# Patient Record
Sex: FEMALE | Race: WHITE | Employment: OTHER | URBAN - METROPOLITAN AREA
[De-identification: names, ages, dates, MRNs, and addresses within clinical notes are randomized per-mention and may not be internally consistent; named-entity substitution may affect disease eponyms.]

---

## 2017-02-06 ENCOUNTER — IP HISTORICAL/CONVERTED ENCOUNTER (OUTPATIENT)
Dept: OTHER | Age: 67
End: 2017-02-06

## 2017-04-10 ENCOUNTER — OP HISTORICAL/CONVERTED ENCOUNTER (OUTPATIENT)
Dept: OTHER | Age: 67
End: 2017-04-10

## 2018-09-06 ENCOUNTER — OFFICE VISIT (OUTPATIENT)
Dept: ENDOCRINOLOGY | Age: 68
End: 2018-09-06

## 2018-09-06 ENCOUNTER — HOSPITAL ENCOUNTER (OUTPATIENT)
Dept: LAB | Age: 68
Discharge: HOME | End: 2018-09-06
Payer: MEDICARE

## 2018-09-06 VITALS
DIASTOLIC BLOOD PRESSURE: 89 MMHG | TEMPERATURE: 97.8 F | WEIGHT: 163.8 LBS | SYSTOLIC BLOOD PRESSURE: 183 MMHG | HEART RATE: 63 BPM | RESPIRATION RATE: 16 BRPM | HEIGHT: 64 IN | OXYGEN SATURATION: 95 % | BODY MASS INDEX: 27.96 KG/M2

## 2018-09-06 DIAGNOSIS — E04.9 GOITER: Primary | ICD-10-CM

## 2018-09-06 DIAGNOSIS — E05.90 HYPERTHYROIDISM: ICD-10-CM

## 2018-09-06 DIAGNOSIS — T46.2X5A AMIODARONE-INDUCED HYPERTHYROIDISM: ICD-10-CM

## 2018-09-06 DIAGNOSIS — E05.80 AMIODARONE-INDUCED HYPERTHYROIDISM: ICD-10-CM

## 2018-09-06 PROCEDURE — 84439 ASSAY OF FREE THYROXINE: CPT

## 2018-09-06 PROCEDURE — 36415 COLL VENOUS BLD VENIPUNCTURE: CPT

## 2018-09-06 PROCEDURE — 83520 IMMUNOASSAY QUANT NOS NONAB: CPT

## 2018-09-06 PROCEDURE — 84480 ASSAY TRIIODOTHYRONINE (T3): CPT

## 2018-09-06 PROCEDURE — 84443 ASSAY THYROID STIM HORMONE: CPT

## 2018-09-06 RX ORDER — APIXABAN 5 MG/1
10 TABLET, FILM COATED ORAL DAILY
COMMUNITY
Start: 2018-08-31 | End: 2022-09-08

## 2018-09-06 RX ORDER — LISINOPRIL 5 MG/1
5 TABLET ORAL DAILY
COMMUNITY
Start: 2018-07-21 | End: 2022-09-08

## 2018-09-06 RX ORDER — BISMUTH SUBSALICYLATE 262 MG
1 TABLET,CHEWABLE ORAL DAILY
COMMUNITY
End: 2020-02-27

## 2018-09-06 RX ORDER — METOPROLOL TARTRATE 50 MG/1
75 TABLET ORAL 2 TIMES DAILY
COMMUNITY
Start: 2018-08-28 | End: 2020-02-27

## 2018-09-06 RX ORDER — FUROSEMIDE 80 MG/1
80 TABLET ORAL DAILY
Status: ON HOLD | COMMUNITY
Start: 2018-07-21 | End: 2021-07-03 | Stop reason: SDUPTHER

## 2018-09-06 RX ORDER — DIGOXIN 125 MCG
0.12 TABLET ORAL DAILY
COMMUNITY
Start: 2018-08-21 | End: 2022-09-04

## 2018-09-06 RX ORDER — AMIODARONE HYDROCHLORIDE 200 MG/1
200 TABLET ORAL DAILY
COMMUNITY
Start: 2018-08-31 | End: 2022-09-08

## 2018-09-06 NOTE — PROGRESS NOTES
Martha Bryant is a 78 y.o. female here for   Chief Complaint   Patient presents with    New Patient     referred by NP Lisbet Cobb for Thyroid       1. Have you been to the ER, urgent care clinic since your last visit? Hospitalized since your last visit? -n/a    2. Have you seen or consulted any other health care providers outside of the [address] [place] [address] since your last visit?   Include any pap smears or colon screening.-n/a

## 2018-09-06 NOTE — PROGRESS NOTES
[facility]               Alicia Borden MD        [address] 78 444 81 66 Fax [phone]           Patient Information  Date:9/9/2018  Name : Merari Rodríguez 78 y.o.     YOB: 1950         Referred by: Sin Berg MD         Chief Complaint   Patient presents with   [facility] Patient     referred by JODI Quinn for Thyroid       History of present illness    Merari Rodríguez is a 78 y.o. female  here for evaluation of thyroid. She was referred initially in April 2018, she canceled the appointments twice in the past  Clinically she was found to have goiter, subsequently had thyroid ultrasound which showed diffusely enlarged thyroid gland with no dominant nodules. Labs were suggestive of hyperthyroidism. She did lose weight, nervousness, shakiness which has improved the last 1 month. She is on amiodarone for atrial fibrillation, beta blockers also. Last 1 month she has gained 2 pounds    No change in the size of the neck or neck pain. No dysphagia,dysphonia or dyspnea. No history of known radiation exposure     FH of thyroid disease. No FH of thyroid cancer     Wt Readings from Last 3 Encounters:   09/06/18 163 lb 12.8 oz (74.3 kg)       Past Medical History:   Diagnosis Date    Atrial fibrillation (HCC)     Cardiomyopathy (HCC)     Chronic systolic heart failure (HCC)     COPD (chronic obstructive pulmonary disease) (HCC)     Goiter     Histoplasmosis     Hypertension        Current Outpatient Prescriptions   Medication Sig    digoxin (LANOXIN) 0.125 mg tablet Take 0.125 mg by mouth daily.  lisinopril (PRINIVIL, ZESTRIL) 5 mg tablet Take 5 mg by mouth daily.  amiodarone (CORDARONE) 200 mg tablet Take 200 mg by mouth daily.  metoprolol tartrate (LOPRESSOR) 50 mg tablet Take 75 mg by mouth two (2) times a day.  furosemide (LASIX) 80 mg tablet Take 80 mg by mouth daily.  ELIQUIS 5 mg tablet Take 10 mg by mouth daily.     multivitamin (ONE A DAY) tablet Take 1 Tab by mouth daily. No current facility-administered medications for this visit. Review of Systems:  - Constitutional Symptoms: no fevers, chills, + weight loss  - Eyes: no blurry vision or double vision  - Cardiovascular: no chest pain , no palpitations  - Respiratory: no cough or shortness of breath  - Gastrointestinal: no dysphagia or abdominal pain  - Musculoskeletal: no joint pains or weakness  - Integumentary: no rashes  - Neurological: no numbness, tingling, or headaches  - Psychiatric: no depression or anxiety  - Endocrine: no heat or cold intolerance, no polyuria or polydipsia    Physical Examination:  Blood pressure 183/89, pulse 63, temperature 97.8 °F (36.6 °C), temperature source Oral, resp. rate 16, height 5' 4\" (1.626 m), weight 163 lb 12.8 oz (74.3 kg), SpO2 95 %. Body mass index is 28.12 kg/(m^2). - General: pleasant, no distress, good eye contact  - HEENT: no exopthalmos, no periorbital edema, no scleral/conjunctival injection, EOMI, no lid lag or stare  - Neck: supple, diffuse thyromegaly, nontender, negative Caballo sign  - Cardiovascular: regular,  normal S1 and S2, no murmurs  - Respiratory: clear to auscultation bilaterally  - Gastrointestinal: soft, nontender, nondistended, BS +  - Musculoskeletal: no proximal muscle weakness in upper or lower extremities  - Integumentary: Fine tremors, no edema  - Neurological: alert and oriented   - Psychiatric: normal mood and affect  - Skin - normal turgor    Data Reviewed:         [] Reviewed lab  s    Assessment/Plan:     1. Goiter    2. Hyperthyroidism    3.  Amiodarone-induced hyperthyroidism      Diffuse goiter: No compressive symptoms    Hyperthyroidism based on the labs from March 2018  She  had symptoms then which has resolved now, recheck TSH, free T4, thyroid receptor antibody  She is also on amiodarone which can cause thyrotoxicosis which can be temporary if it is thyroiditis versus intrinsic over secretion if it is amiodarone induced thyrotoxicosis type I  We may not be able to use radioactive iodine therapy in patients who are on amiodarone unless there is some iodine uptake  Discussed about compressive symptoms. Addendum  Thyroid function test still shows hyperthyroidism  Need radioactive iodine uptake and scan, if there is  reasonable uptake she will benefit from radioactive iodine therapy which will decrease the goiter size, however being on amiodarone most of the cases the uptake will be low, in that case methimazole to decrease the hormone secretion followed by surgery is an option      Follow-up Disposition:  Return if symptoms worsen or fail to improve. Thank you for allowing me to participate in the care of this patient. Narinder William MD      Patient Brandon Peraza verbalized understanding  Voice-recognition software was used to generate this report, which may result in some phonetic-based errors in the grammar and contents. Even though attempts were made to correct all the mistakes, some may have been missed and remained in the body of the report.

## 2018-09-06 NOTE — MR AVS SNAPSHOT
[address] [facility] [address] 
[phone] Patient: Nicol Rosales MRN: [id number] KBY:6[birth year]98/[birth year] Visit Information Date & Time Provider Department Dept. Phone Encounter #  
 9/6/2018  1:00 PM Heather Frederick MD [facility] [phone] [id number] Follow-up Instructions Return if symptoms worsen or fail to improve. Upcoming Health Maintenance Date Due Hepatitis C Screening 1950 DTaP/Tdap/Td series (1 - Tdap) 9/20/1972 BREAST CANCER SCRN MAMMOGRAM 9/20/2001 FOBT Q 1 YEAR AGE 50-75 9/20/2001 ZOSTER VACCINE AGE 60> 7/20/2011 GLAUCOMA SCREENING Q2Y 9/20/2016 Bone Densitometry (Dexa) Screening 9/20/2016 Pneumococcal 65+ Low/Medium Risk (1 of 2 - PCV13) 9/20/2016 MEDICARE YEARLY EXAM 5/17/2018 Influenza Age 5 to Adult 8/1/2018 Allergies as of 9/6/2018  Review Complete On: 9/6/2018 By: Heather Frederick MD  
 No Known Allergies Current Immunizations  Never Reviewed No immunizations on file. Not reviewed this visit You Were Diagnosed With   
  
 Codes Comments Goiter    -  Primary ICD-10-CM: E04.9 ICD-9-CM: 240.9 Hyperthyroidism     ICD-10-CM: E05.90 ICD-9-CM: 242.90 Vitals BP Pulse Temp Resp Height(growth percentile) Weight(growth percentile) 183/89 (BP 1 Location: Left arm, BP Patient Position: Sitting) 63 97.8 °F (36.6 °C) (Oral) 16 5' 4\" (1.626 m) 163 lb 12.8 oz (74.3 kg) SpO2 BMI OB Status Smoking Status 95% 28.12 kg/m2 Hysterectomy Current Every Day Smoker Vitals History BMI and BSA Data Body Mass Index Body Surface Area  
 28.12 kg/m 2 1.83 m 2 Preferred Pharmacy Pharmacy Name Phone [id number] [address] AT [place] [address] [phone] Your Updated Medication List  
  
   
 This list is accurate as of 9/6/18  2:05 PM.  Always use your most recent med list.  
  
  
  
  
 amiodarone 200 mg tablet Commonly known as:  CORDARONE Take 200 mg by mouth daily. digoxin 0.125 mg tablet Commonly known as:  LANOXIN Take 0.125 mg by mouth daily. ELIQUIS 5 mg tablet Generic drug:  apixaban Take 10 mg by mouth daily. furosemide 80 mg tablet Commonly known as:  LASIX Take 80 mg by mouth daily. lisinopril 5 mg tablet Commonly known as:  Maureen Kristen Take 5 mg by mouth daily. metoprolol tartrate 50 mg tablet Commonly known as:  LOPRESSOR Take 75 mg by mouth two (2) times a day. multivitamin tablet Commonly known as:  ONE A DAY Take 1 Tab by mouth daily. We Performed the Following   
 T3 TOTAL C1078143 CPT(R)] T4, FREE C899275 CPT(R)] TSH 3RD GENERATION [73196 CPT(R)] TSH RECEPTOR AB [57789 CPT(R)] Follow-up Instructions Return if symptoms worsen or fail to improve. Patient Instructions Radioactive Iodine: What to Expect at [facility] Your Recovery Radioactive iodine is absorbed and concentrated by the thyroid gland. You get it in liquid or pill form. The radiation will pass out of your body through your urine within days. Until that time, you will give off radiation in your sweat, your saliva, your urine, and anything else that comes out of your body. It is important to avoid exposing other people to the radioactivity from your body. Your doctor will give you more written instructions. Follow these carefully. The instructions will tell you how far to stay away from people and how long you need to follow the precautions listed below. They will list other ways to keep other people safe. They will also tell you when it will be safe to go out, go to work, and do other activities. How can you care for yourself at home? General recommendations · For a period of time, you will need to keep your distance from other people, especially young children and pregnant women. · Avoid close contact, kissing, and sexual activity. You may need to sleep in a separate bed from your partner. · Keep the toilet very clean. Men should urinate sitting down to avoid splashing. Flush the toilet 2 or 3 times after each use. Wash your hands well with soap and lots of water each time you use the toilet. · Rinse the bathroom sink and tub well after you use them. · Use separate towels, washcloths, and sheets. Wash these and your personal clothing by themselves. Don't wash them with other people's laundry. · You may want to use a special plastic trash bag for all your trash, such as bandages, paper or plastic dishes, menstrual pads, tissues, or paper towels. Talk to your treatment facility to see if they will handle the disposal. Or after 80 days, this bag can be thrown out with your other trash. · Wash your dishes in a  or by hand. If you use disposable dishes, they must be thrown away in the special plastic trash bag. · Don't cook for other people. If you must cook, use plastic gloves. Then throw them away in the special plastic trash bag. Don't share cups, dishes, or utensils. Pregnancy and children · Your doctor will tell you when it is safe to have sex and become pregnant. · You should not breastfeed your baby after you have been treated with radioactive iodine. Ask your doctor when it's safe to breastfeed. Travel · Don't take public transportation. If you are able, it's best to drive yourself. · It is important to prepare for any problems you may have at airport security. People who have had radioactive iodine treatment can set off the radiation detection machines in airports for a week to 10 days. Check with local authorities about any steps or permission you may need to travel.  
· If you plan to travel on the interstate, you may set off radiation detectors. Most police and transportation workers are aware of medical radiation, but it may help to carry some paperwork from your doctor. Follow-up care is a key part of your treatment and safety. Be sure to make and go to all appointments, and call your doctor if you are having problems. It's also a good idea to know your test results and keep a list of the medicines you take. When should you call for help? Watch closely for any changes in your health, and be sure to contact your doctor if: 
  · You have a sore throat.  
  · You vomit.  
  · You have diarrhea. Where can you learn more? Go to http://zeferino-martha.info/. Enter U821 in the search box to learn more about \"Radioactive Iodine: What to Expect at Home. \" Current as of: May 12, 2017 Content Version: 11.7 © 9824-6846 [employer]. Care instructions adapted under license by [employer] (which disclaims liability or warranty for this information). If you have questions about a medical condition or this instruction, always ask your healthcare professional. [place] [address] any warranty or liability for your use of this information. Hyperthyroidism: Care Instructions Your Care Instructions Hyperthyroidism occurs when the thyroid gland makes too much thyroid hormone. This speeds up your metabolism-how your body uses energy. This condition can cause you to be very active, lose weight, and have sleep problems, eye problems, and a fast heart rate. It can also cause a goiter. A goiter is an enlarged thyroid gland that you can see at the front of the neck. Hyperthyroidism is often caused by Graves' disease. In Graves' disease, the body's defense (immune) system attacks the thyroid gland. Your doctor may prescribe a beta-blocker medicine to slow your pulse and calm you down. But this is not a treatment for hyperthyroidism.  It is given for your fast heart rate. Your doctor may also give you antithyroid medicine. This medicine keeps excess thyroid hormone in check. In some cases, doctors recommend radioactive iodine or surgery to remove the thyroid. After either of these treatments, you may need to take medicine to replace thyroid hormone for the rest of your life. Follow-up care is a key part of your treatment and safety. Be sure to make and go to all appointments, and call your doctor if you are having problems. It's also a good idea to know your test results and keep a list of the medicines you take. How can you care for yourself at home? · Take your medicines exactly as prescribed. You need to take the thyroid medicine at the same time each day. Call your doctor if you think you are having a problem with your medicine. · Graves' disease can make your eyes sore. Use artificial tears, eye drops, and sunglasses to protect your eyes from dryness, wind, and sun. Raise your head with pillows at night to prevent your eyes from swelling. In some cases, taping your eyelids shut at night will keep your eyes from being dry in the morning. · Make sure you get enough calcium. Foods that are rich in calcium include milk, yogurt, cheese, and dark green vegetables. · If you need to gain weight, ask your doctor about special diets. · Do not eat kelp. [place] is high in iodine, which can make hyperthyroidism worse. [place] is commonly used in [employer] and other [place] foods. You can use iodized salt and eat bread and seafood. Try to eat a balanced diet. · Do not use caffeine and other stimulants. These can make symptoms worse, such as a fast heartbeat, nervousness, and problems focusing. · Do not smoke. Smoking can make your condition worse and may lead to more serious eye problems. If you need help quitting, talk to your doctor about stop-smoking programs and medicines. These can increase your chances of quitting for good. · Lower your stress. Learn to use biofeedback, guided imagery, meditation, or other methods to relax. · Use creams or ointments for irritated skin. Ask your doctor which type to use. · Tell all your doctors about your condition. They need to know because some medicines contain iodine. When should you call for help? Call your doctor now or seek immediate medical care if: 
  · You have symptoms of a sudden, very high thyroid level (thyroid storm). These include: ¨ Being nauseated, vomiting, and having diarrhea. ¨ Sweating a lot. ¨ Feeling extremely restless and confused. ¨ Having a high fever. ¨ Having a fast heartbeat.  
  · You have sudden vision changes or eye pain.  
  · You have a fever or severe sore throat and are taking antithyroid medicines, such as PTU or methimazole.  
 Watch closely for changes in your health, and be sure to contact your doctor if: 
  · You have a sore throat or have problems swallowing.  
  · You have swollen, itchy, or red eyes or your other eye symptoms get worse, or you have new vision problems.  
  · You have signs of a low thyroid level (hypothyroidism). You may feel very tired, confused, or weak. Where can you learn more? Go to http://zeferino-martha.info/. Enter A886 in the search box to learn more about \"Hyperthyroidism: Care Instructions. \" Current as of: May 12, 2017 Content Version: 11.7 © 2752-2848 [employer]. Care instructions adapted under license by [employer] (which disclaims liability or warranty for this information). If you have questions about a medical condition or this instruction, always ask your healthcare professional. [address] any warranty or liability for your use of this information. Introducing [facility]!    
 Isha Reyes introduces [employer] patient portal. Now you can access parts of your medical record, email your doctor's office, and request medication refills online. 1. In your internet browser, go to [email or web address] Access [email or web address] 2. Click on the First Time User? Click Here link in the Sign In box. You will see the New Member Sign Up page. 3. Enter your [employer] Access Code exactly as it appears below. You will not need to use this code after youve completed the sign-up process. If you do not sign up before the expiration date, you must request a new code. · [employer] Access Code: [email or web address] Expires: 12/5/2018  2:05 PM 
 
4. Enter the last four digits of your Social Security Number (xxxx) and Date of Birth (mm/dd/yyyy) as indicated and click Submit. You will be taken to the next sign-up page. 5. Create a [employer] ID. This will be your [employer] login ID and cannot be changed, so think of one that is secure and easy to remember. 6. Create a [employer] password. You can change your password at any time. 7. Enter your Password Reset Question and Answer. This can be used at a later time if you forget your password. 8. Enter your e-mail address. You will receive e-mail notification when new information is available in [address] 9. Click Sign Up. You can now view and download portions of your medical record. 10. Click the Download Summary menu link to download a portable copy of your medical information. If you have questions, please visit the Frequently Asked Questions section of the [employer] website. Remember, [employer] is NOT to be used for urgent needs. For medical emergencies, dial 911. Now available from your iPhone and Android! Please provide this summary of care documentation to your next provider. Your primary care clinician is listed as Jazmine Molina. If you have any questions after today's visit, please call [phone].

## 2018-09-06 NOTE — PATIENT INSTRUCTIONS
Radioactive Iodine: What to Expect at Home  Your Recovery  Radioactive iodine is absorbed and concentrated by the thyroid gland. You get it in liquid or pill form. The radiation will pass out of your body through your urine within days. Until that time, you will give off radiation in your sweat, your saliva, your urine, and anything else that comes out of your body. It is important to avoid exposing other people to the radioactivity from your body. Your doctor will give you more written instructions. Follow these carefully. The instructions will tell you how far to stay away from people and how long you need to follow the precautions listed below. They will list other ways to keep other people safe. They will also tell you when it will be safe to go out, go to work, and do other activities. How can you care for yourself at home? General recommendations  · For a period of time, you will need to keep your distance from other people, especially young children and pregnant women. · Avoid close contact, kissing, and sexual activity. You may need to sleep in a separate bed from your partner. · Keep the toilet very clean. Men should urinate sitting down to avoid splashing. Flush the toilet 2 or 3 times after each use. Wash your hands well with soap and lots of water each time you use the toilet. · Rinse the bathroom sink and tub well after you use them. · Use separate towels, washcloths, and sheets. Wash these and your personal clothing by themselves. Don't wash them with other people's laundry. · You may want to use a special plastic trash bag for all your trash, such as bandages, paper or plastic dishes, menstrual pads, tissues, or paper towels. Talk to your treatment facility to see if they will handle the disposal. Or after 80 days, this bag can be thrown out with your other trash. · Wash your dishes in a  or by hand.  If you use disposable dishes, they must be thrown away in the special plastic trash bag. · Don't cook for other people. If you must cook, use plastic gloves. Then throw them away in the special plastic trash bag. Don't share cups, dishes, or utensils. Pregnancy and children  · Your doctor will tell you when it is safe to have sex and become pregnant. · You should not breastfeed your baby after you have been treated with radioactive iodine. Ask your doctor when it's safe to breastfeed. Travel  · Don't take public transportation. If you are able, it's best to drive yourself. · It is important to prepare for any problems you may have at airport security. People who have had radioactive iodine treatment can set off the radiation detection machines in airports for a week to 10 days. Check with local authorities about any steps or permission you may need to travel. · If you plan to travel on the interstate, you may set off radiation detectors. Most police and transportation workers are aware of medical radiation, but it may help to carry some paperwork from your doctor. Follow-up care is a key part of your treatment and safety. Be sure to make and go to all appointments, and call your doctor if you are having problems. It's also a good idea to know your test results and keep a list of the medicines you take. When should you call for help? Watch closely for any changes in your health, and be sure to contact your doctor if:    · You have a sore throat.     · You vomit.     · You have diarrhea. Where can you learn more? Go to http://zeferino-martha.info/. Enter Z067 in the search box to learn more about \"Radioactive Iodine: What to Expect at Home. \"  Current as of: May 12, 2017  Content Version: 11.7  © 7320-9732 [employer]. Care instructions adapted under license by [employer] (which disclaims liability or warranty for this information).  If you have questions about a medical condition or this instruction, always ask your healthcare professional. [employer], [facility] disclaims any warranty or liability for your use of this information. Hyperthyroidism: Care Instructions  Your Care Instructions  Hyperthyroidism occurs when the thyroid gland makes too much thyroid hormone. This speeds up your metabolism-how your body uses energy. This condition can cause you to be very active, lose weight, and have sleep problems, eye problems, and a fast heart rate. It can also cause a goiter. A goiter is an enlarged thyroid gland that you can see at the front of the neck. Hyperthyroidism is often caused by Graves' disease. In Graves' disease, the body's defense (immune) system attacks the thyroid gland. Your doctor may prescribe a beta-blocker medicine to slow your pulse and calm you down. But this is not a treatment for hyperthyroidism. It is given for your fast heart rate. Your doctor may also give you antithyroid medicine. This medicine keeps excess thyroid hormone in check. In some cases, doctors recommend radioactive iodine or surgery to remove the thyroid. After either of these treatments, you may need to take medicine to replace thyroid hormone for the rest of your life. Follow-up care is a key part of your treatment and safety. Be sure to make and go to all appointments, and call your doctor if you are having problems. It's also a good idea to know your test results and keep a list of the medicines you take. How can you care for yourself at home? · Take your medicines exactly as prescribed. You need to take the thyroid medicine at the same time each day. Call your doctor if you think you are having a problem with your medicine. · Graves' disease can make your eyes sore. Use artificial tears, eye drops, and sunglasses to protect your eyes from dryness, wind, and sun. Raise your head with pillows at night to prevent your eyes from swelling. In some cases, taping your eyelids shut at night will keep your eyes from being dry in the morning.   · Make sure you get enough calcium. Foods that are rich in calcium include milk, yogurt, cheese, and dark green vegetables. · If you need to gain weight, ask your doctor about special diets. · Do not eat kelp. Cassie Leventhal is high in iodine, which can make hyperthyroidism worse. Cassie Leventhal is commonly used in [employer] and other [place] foods. You can use iodized salt and eat bread and seafood. Try to eat a balanced diet. · Do not use caffeine and other stimulants. These can make symptoms worse, such as a fast heartbeat, nervousness, and problems focusing. · Do not smoke. Smoking can make your condition worse and may lead to more serious eye problems. If you need help quitting, talk to your doctor about stop-smoking programs and medicines. These can increase your chances of quitting for good. · Lower your stress. Learn to use biofeedback, guided imagery, meditation, or other methods to relax. · Use creams or ointments for irritated skin. Ask your doctor which type to use. · Tell all your doctors about your condition. They need to know because some medicines contain iodine. When should you call for help? Call your doctor now or seek immediate medical care if:    · You have symptoms of a sudden, very high thyroid level (thyroid storm). These include:  ¨ Being nauseated, vomiting, and having diarrhea. ¨ Sweating a lot. ¨ Feeling extremely restless and confused. ¨ Having a high fever. ¨ Having a fast heartbeat.     · You have sudden vision changes or eye pain.     · You have a fever or severe sore throat and are taking antithyroid medicines, such as PTU or methimazole.    Watch closely for changes in your health, and be sure to contact your doctor if:    · You have a sore throat or have problems swallowing.     · You have swollen, itchy, or red eyes or your other eye symptoms get worse, or you have new vision problems.     · You have signs of a low thyroid level (hypothyroidism). You may feel very tired, confused, or weak.    Where can you learn more? Go to http://zeferino-martha.info/. Enter E871 in the search box to learn more about \"Hyperthyroidism: Care Instructions. \"  Current as of: May 12, 2017  Content Version: 11.7  © 5975-0310 [employer]. Care instructions adapted under license by [employer] (which disclaims liability or warranty for this information). If you have questions about a medical condition or this instruction, always ask your healthcare professional. [address] any warranty or liability for your use of this information.

## 2018-09-07 LAB
T3 SERPL-MCNC: 186 NG/DL (ref 71–180)
T4 FREE SERPL-MCNC: 4.47 NG/DL (ref 0.82–1.77)
TSH RECEP AB SER-ACNC: <0.5 IU/L (ref 0–1.75)
TSH SERPL DL<=0.005 MIU/L-ACNC: <0.006 UIU/ML (ref 0.45–4.5)

## 2018-09-08 DIAGNOSIS — E04.9 GOITER: ICD-10-CM

## 2018-09-08 DIAGNOSIS — E05.80 AMIODARONE-INDUCED HYPERTHYROIDISM: ICD-10-CM

## 2018-09-08 DIAGNOSIS — E05.90 HYPERTHYROIDISM: Primary | ICD-10-CM

## 2018-09-08 DIAGNOSIS — T46.2X5A AMIODARONE-INDUCED HYPERTHYROIDISM: ICD-10-CM

## 2018-09-08 NOTE — PROGRESS NOTES
Thyroid still very overactive, she needs thyroid scan and iodine treatment as discussed during the visit  I will order it and the hospital will call her.   It is very important to get treated on her as there is risk for heart problems,fractures

## 2018-09-10 ENCOUNTER — TELEPHONE (OUTPATIENT)
Dept: ENDOCRINOLOGY | Age: 68
End: 2018-09-10

## 2018-09-10 NOTE — TELEPHONE ENCOUNTER
----- Message from Pearline Severs, MD sent at 9/8/2018 12:42 PM EDT -----  Thyroid still very overactive, she needs thyroid scan and iodine treatment as discussed during the visit  I will order it and the hospital will call her.   It is very important to get treated on her as there is risk for heart problems,fractures

## 2018-09-10 NOTE — TELEPHONE ENCOUNTER
Per Dr. Kelly Fong, informed pt of result note, as noted above. Pt verbalized understanding with no further questions or concerns at this time.

## 2019-10-26 ENCOUNTER — IP HISTORICAL/CONVERTED ENCOUNTER (OUTPATIENT)
Dept: OTHER | Age: 69
End: 2019-10-26

## 2019-11-05 LAB
HBA1C MFR BLD HPLC: 5.8 %
LDL-C, EXTERNAL: 76

## 2019-11-06 ENCOUNTER — IP HISTORICAL/CONVERTED ENCOUNTER (OUTPATIENT)
Dept: OTHER | Age: 69
End: 2019-11-06

## 2019-11-23 LAB — CREATININE, EXTERNAL: 1.18

## 2019-12-30 ENCOUNTER — OFFICE VISIT (OUTPATIENT)
Dept: ENDOCRINOLOGY | Age: 69
End: 2019-12-30

## 2019-12-30 VITALS
SYSTOLIC BLOOD PRESSURE: 163 MMHG | WEIGHT: 151.6 LBS | TEMPERATURE: 97.5 F | BODY MASS INDEX: 25.88 KG/M2 | HEIGHT: 64 IN | RESPIRATION RATE: 18 BRPM | DIASTOLIC BLOOD PRESSURE: 89 MMHG | HEART RATE: 90 BPM | OXYGEN SATURATION: 97 %

## 2019-12-30 DIAGNOSIS — E05.80 AMIODARONE-INDUCED HYPERTHYROIDISM: ICD-10-CM

## 2019-12-30 DIAGNOSIS — T46.2X5A AMIODARONE-INDUCED HYPERTHYROIDISM: ICD-10-CM

## 2019-12-30 DIAGNOSIS — E05.80 AMIODARONE-INDUCED HYPERTHYROIDISM: Primary | ICD-10-CM

## 2019-12-30 DIAGNOSIS — T46.2X5A AMIODARONE-INDUCED HYPERTHYROIDISM: Primary | ICD-10-CM

## 2019-12-30 DIAGNOSIS — E05.90 HYPERTHYROIDISM: Primary | ICD-10-CM

## 2019-12-30 DIAGNOSIS — E04.9 GOITER: ICD-10-CM

## 2019-12-30 RX ORDER — METHIMAZOLE 10 MG/1
20 TABLET ORAL DAILY
Qty: 60 TAB | Refills: 5 | Status: SHIPPED | OUTPATIENT
Start: 2019-12-30 | End: 2020-02-27 | Stop reason: SDUPTHER

## 2019-12-30 RX ORDER — ATORVASTATIN CALCIUM 20 MG/1
TABLET, FILM COATED ORAL
COMMUNITY
Start: 2019-12-21

## 2019-12-30 RX ORDER — ERGOCALCIFEROL 1.25 MG/1
CAPSULE ORAL
COMMUNITY
Start: 2019-12-21

## 2019-12-30 NOTE — PROGRESS NOTES
[facility]               Ephraim Cuevas MD        [address] 78 [phone] Fax [phone]           Patient Information  Date:12/30/2019  Name : Moni Garcia 77 y.o.     YOB: 1950         Referred by: Carol Mccullough MD         Chief Complaint   Patient presents with    Thyroid Problem     hyperthyroid    Weight Management       History of present illness    Moni Garcia is a 77 y.o. female  here for follow-up  She was last seen in 2018, did not follow-up, did not have thyroid uptake and scan as well as ultrasound which was ordered. TSH is suppressed  No dysphagia,  On amiodarone for atrial fibrillation    Prior history  Clinically she was found to have goiter, subsequently had thyroid ultrasound which showed diffusely enlarged thyroid gland with no dominant nodules. FH of thyroid disease. No FH of thyroid cancer     Wt Readings from Last 3 Encounters:   12/30/19 151 lb 9.6 oz (68.8 kg)   09/06/18 163 lb 12.8 oz (74.3 kg)       Past Medical History:   Diagnosis Date    Atrial fibrillation (HCC)     Cardiomyopathy (HCC)     Chronic systolic heart failure (HCC)     COPD (chronic obstructive pulmonary disease) (HCC)     Goiter     Histoplasmosis     Hypertension        Current Outpatient Medications   Medication Sig    atorvastatin (LIPITOR) 20 mg tablet TAKE 1 TABLET BY MOUTH EVERY DAY    ergocalciferol (ERGOCALCIFEROL) 50,000 unit capsule TAKE 1 CAPSULE BY MOUTH ONE TIME PER WEEK    ferrous sulfate (IRON PO) Take  by mouth.  lisinopril (PRINIVIL, ZESTRIL) 5 mg tablet Take 5 mg by mouth daily.  amiodarone (CORDARONE) 200 mg tablet Take 200 mg by mouth daily.  furosemide (LASIX) 80 mg tablet Take 80 mg by mouth daily.  ELIQUIS 5 mg tablet Take 10 mg by mouth daily.  digoxin (LANOXIN) 0.125 mg tablet Take 0.125 mg by mouth daily.     metoprolol tartrate (LOPRESSOR) 50 mg tablet Take 75 mg by mouth two (2) times a day.    multivitamin (ONE A DAY) tablet Take 1 Tab by mouth daily. No current facility-administered medications for this visit. Review of Systems:  - Constitutional Symptoms: no fevers, chills, + weight loss  - Eyes: no blurry vision or double vision  - Cardiovascular: no chest pain , palpitations  - Respiratory: no cough or shortness of breath  - Gastrointestinal: no dysphagia or abdominal pain  -     Physical Examination:  Blood pressure 163/89, pulse 90, temperature 97.5 °F (36.4 °C), temperature source Oral, resp. rate 18, height 5' 4\" (1.626 m), weight 151 lb 9.6 oz (68.8 kg), SpO2 97 %. Body mass index is 26.02 kg/m². - General: pleasant, no distress, good eye contact  - HEENT: no exopthalmos, no periorbital edema, no scleral/conjunctival injection, EOMI, no lid lag or stare  - Neck: supple, diffuse thyromegaly, nontender, negative Mavis sign  - Cardiovascular: Irregular,  normal S1 and S2  - Respiratory: clear to auscultation bilaterally  - Gastrointestinal: soft, nontender, nondistended, BS +  - Musculoskeletal: no proximal muscle weakness in upper or lower extremities  - Integumentary: No tremors  - Neurological: alert and oriented   - Psychiatric: normal mood and affect  - Skin - normal turgor    Data Reviewed:         [] Reviewed lab  s    Assessment/Plan:     1. Amiodarone-induced hyperthyroidism    2.  Goiter      Diffuse goiter: No compressive symptoms    Hyperthyroidism: Amiodarone induced hyperthyroidism  She did not get the radioactive iodine uptake and scan which was ordered in 2018  We will start methimazole given comorbidities, after 3 months we will reassess the need for scan    Need radioactive iodine uptake and scan, if there is  reasonable uptake she will benefit from radioactive iodine therapy which will decrease the goiter size, however being on amiodarone most of the cases the uptake will be low, in that case methimazole to decrease the hormone secretion followed by surgery is an option          Thank you for allowing me to participate in the care of this patient. Marybel Boudreaux MD      Patient Derotha Danger verbalized understanding  Voice-recognition software was used to generate this report, which may result in some phonetic-based errors in the grammar and contents. Even though attempts were made to correct all the mistakes, some may have been missed and remained in the body of the report.

## 2019-12-30 NOTE — PATIENT INSTRUCTIONS
Start Methimazole 20 mg in AM     Potential side effects of methimazole are rash,low blood count and rarely liver inflammation. If you get high grade fever,bad sorethroat,or sores in the mouth ,please call the office for blood tests. If white blood count is low,the methimazole should be stopped and the counts will normalize in 7 to 10 days.

## 2019-12-30 NOTE — PROGRESS NOTES
Room 2    Identified pt with two pt identifiers(name and ). Reviewed record in preparation for visit and have obtained necessary documentation. All patient medications has been reviewed. Chief Complaint   Patient presents with    Thyroid Problem     hyperthyroid    Weight Management       Health Maintenance Due   Topic    Hepatitis C Screening     DTaP/Tdap/Td series (1 - Tdap)    Shingrix Vaccine Age 50> (1 of 2)    BREAST CANCER SCRN MAMMOGRAM     FOBT Q 1 YEAR AGE 50-75     GLAUCOMA SCREENING Q2Y     Bone Densitometry (Dexa) Screening     Pneumococcal 65+ years (1 of 1 - PPSV23)    MEDICARE YEARLY EXAM     Influenza Age 5 to Adult        Vitals:    19 1022   BP: 163/89   Pulse: 90   Resp: 18   Temp: 97.5 °F (36.4 °C)   TempSrc: Oral   SpO2: 97%   Weight: 151 lb 9.6 oz (68.8 kg)   Height: 5' 4\" (1.626 m)   PainSc:   0 - No pain       Wt Readings from Last 3 Encounters:   19 151 lb 9.6 oz (68.8 kg)   18 163 lb 12.8 oz (74.3 kg)     Temp Readings from Last 3 Encounters:   19 97.5 °F (36.4 °C) (Oral)   18 97.8 °F (36.6 °C) (Oral)     BP Readings from Last 3 Encounters:   19 163/89   18 183/89     Pulse Readings from Last 3 Encounters:   19 90   18 63       Lab Results   Component Value Date/Time    Hemoglobin A1c, External 6.3 2018       Coordination of Care Questionnaire:   1) Have you been to an emergency room, urgent care, or hospitalized since your last visit?   no       2. Have seen or consulted any other health care provider since your last visit? NO    3) Do you have an Advanced Directive/ Living Will in place? NO  If yes, do we have a copy on file NO  If no, would you like information NO    Patient is accompanied by self I have received verbal consent from Fabricio Abel to discuss any/all medical information while they are present in the room.

## 2020-02-24 ENCOUNTER — HOSPITAL ENCOUNTER (OUTPATIENT)
Dept: LAB | Age: 70
Discharge: HOME | End: 2020-02-24

## 2020-02-24 DIAGNOSIS — E05.80 AMIODARONE-INDUCED HYPERTHYROIDISM: ICD-10-CM

## 2020-02-24 DIAGNOSIS — E04.9 GOITER: ICD-10-CM

## 2020-02-24 DIAGNOSIS — T46.2X5A AMIODARONE-INDUCED HYPERTHYROIDISM: ICD-10-CM

## 2020-02-24 LAB
T4 FREE SERPL-MCNC: 0.8 NG/DL (ref 0.8–1.5)
TSH SERPL DL<=0.05 MIU/L-ACNC: 0.03 UIU/ML (ref 0.36–3.74)

## 2020-02-26 LAB — T3 SERPL-MCNC: 74 NG/DL (ref 71–180)

## 2020-02-27 ENCOUNTER — OFFICE VISIT (OUTPATIENT)
Dept: ENDOCRINOLOGY | Age: 70
End: 2020-02-27

## 2020-02-27 VITALS
DIASTOLIC BLOOD PRESSURE: 83 MMHG | RESPIRATION RATE: 20 BRPM | TEMPERATURE: 96.2 F | WEIGHT: 159 LBS | OXYGEN SATURATION: 100 % | SYSTOLIC BLOOD PRESSURE: 150 MMHG | BODY MASS INDEX: 27.14 KG/M2 | HEART RATE: 76 BPM | HEIGHT: 64 IN

## 2020-02-27 DIAGNOSIS — E05.80 AMIODARONE-INDUCED HYPERTHYROIDISM: ICD-10-CM

## 2020-02-27 DIAGNOSIS — E05.90 HYPERTHYROIDISM: ICD-10-CM

## 2020-02-27 DIAGNOSIS — E04.9 GOITER: Primary | ICD-10-CM

## 2020-02-27 DIAGNOSIS — T46.2X5A AMIODARONE-INDUCED HYPERTHYROIDISM: ICD-10-CM

## 2020-02-27 RX ORDER — METHIMAZOLE 10 MG/1
10 TABLET ORAL DAILY
Qty: 90 TAB | Refills: 3 | Status: SHIPPED | OUTPATIENT
Start: 2020-02-27 | End: 2021-03-04

## 2020-02-27 NOTE — LETTER
2/28/20 Patient: Allie Echols YOB: 1950 Date of Visit: 2/27/2020 Shirley Perez MD 
[address] [facility] [address] VIA Facsimile: [phone] Dear Shirley Perez MD, Thank you for referring Ms. Allie Echols to [address] [place] [address] for evaluation. My notes for this consultation are attached. If you have questions, please do not hesitate to call me. I look forward to following your patient along with you. Sincerely, Boris Nails MD

## 2020-02-27 NOTE — PROGRESS NOTES
Josefa Wesley MD          Patient Information  Date:2/27/2020  Name : Carrillo Badillo 77 y.o.     YOB: 1950         Referred by: Brynn Flores MD         Chief Complaint   Patient presents with    Thyroid Problem       History of present illness    Carrillo Badillo is a 77 y.o. female  here for follow-up  She was initially evaluated in 2018, did not follow-up, did not have thyroid uptake and scan as well as ultrasound which was ordered. She was later seen in December 2019, severely symptomatic as a result of hyperthyroidism  On amiodarone for atrial fibrillation  She was started on methimazole  She feels better  No palpitations, gaining weight no      Prior history  Clinically she was found to have goiter 2018 ,subsequently had thyroid ultrasound which showed diffusely enlarged thyroid gland with no dominant nodules. FH of thyroid disease. No FH of thyroid cancer     Wt Readings from Last 3 Encounters:   02/27/20 159 lb (72.1 kg)   12/30/19 151 lb 9.6 oz (68.8 kg)   09/06/18 163 lb 12.8 oz (74.3 kg)       Past Medical History:   Diagnosis Date    Atrial fibrillation (HCC)     Cardiomyopathy (HCC)     Chronic systolic heart failure (HCC)     COPD (chronic obstructive pulmonary disease) (HCC)     Goiter     Histoplasmosis     Hypertension        Current Outpatient Medications   Medication Sig    atorvastatin (LIPITOR) 20 mg tablet TAKE 1 TABLET BY MOUTH EVERY DAY    ergocalciferol (ERGOCALCIFEROL) 50,000 unit capsule TAKE 1 CAPSULE BY MOUTH ONE TIME PER WEEK    ferrous sulfate (IRON PO) Take  by mouth.  methIMAzole (TAPAZOLE) 10 mg tablet Take 2 Tabs by mouth daily.  lisinopril (PRINIVIL, ZESTRIL) 5 mg tablet Take 5 mg by mouth daily.  amiodarone (CORDARONE) 200 mg tablet Take 200 mg by mouth daily.  furosemide (LASIX) 80 mg tablet Take 80 mg by mouth daily.  ELIQUIS 5 mg tablet Take 10 mg by mouth daily.     digoxin (LANOXIN) 0.125 mg tablet Take 0.125 mg by mouth daily. No current facility-administered medications for this visit. Review of Systems:  - Constitutional Symptoms: no fevers, chills,  - Eyes: no blurry vision or double vision  - Cardiovascular: no chest pain , palpitations  - Respiratory: no cough or shortness of breath  - Gastrointestinal: no dysphagia or abdominal pain  -     Physical Examination:  Blood pressure 150/83, pulse 76, temperature 96.2 °F (35.7 °C), temperature source Oral, resp. rate 20, height 5' 4\" (1.626 m), weight 159 lb (72.1 kg), SpO2 100 %. Body mass index is 27.29 kg/m². - General: pleasant, no distress, good eye contact  - HEENT: no exopthalmos, no periorbital edema, no scleral/conjunctival injection, EOMI, no lid lag or stare  - Neck: supple, diffuse thyromegaly, nontender, negative [place] sign  - Cardiovascular: Irregular,  normal S1 and S2  - Respiratory: clear to auscultation bilaterally  - Gastrointestinal: soft, nontender, nondistended, BS +  - Musculoskeletal: no proximal muscle weakness in upper or lower extremities  - Integumentary: No tremors  - Neurological: alert and oriented   - Psychiatric: normal mood and affect  - Skin - normal turgor    Data Reviewed:         [] Reviewed lab  s    Assessment/Plan:     1. Goiter    2.  Amiodarone-induced hyperthyroidism      Diffuse goiter: No compressive symptoms    Hyperthyroidism: Amiodarone induced hyperthyroidism  Started methimazole 12/2019    Need radioactive iodine uptake and scan, if there is  reasonable uptake she will benefit from radioactive iodine therapy which will decrease the goiter size, however being on amiodarone most of the cases the uptake will be low, in that case methimazole to decrease the hormone secretion followed by surgery is an option if she has compressive symptoms  She is doing well on methimazole, would like to continue  Decrease the dose          Thank you for allowing me to participate in the care of this patient. Patrick Carrillo MD      Patient Natalia Asencio verbalized understanding  Voice-recognition software was used to generate this report, which may result in some phonetic-based errors in the grammar and contents. Even though attempts were made to correct all the mistakes, some may have been missed and remained in the body of the report.

## 2020-02-27 NOTE — PROGRESS NOTES
Johanne Melton is a 77 y.o. female here for   Chief Complaint   Patient presents with    Thyroid Problem       1. Have you been to the ER, urgent care clinic since your last visit? Hospitalized since your last visit? -no    2. Have you seen or consulted any other health care providers outside of the [address] [place] [address] since your last visit?   Include any pap smears or colon screening.-no

## 2020-06-08 ENCOUNTER — TELEPHONE (OUTPATIENT)
Dept: ENDOCRINOLOGY | Age: 70
End: 2020-06-08

## 2020-06-08 DIAGNOSIS — E05.90 HYPERTHYROIDISM: Primary | ICD-10-CM

## 2020-06-08 NOTE — TELEPHONE ENCOUNTER
Order placed for pt per verbal order with read back from Dr. Zygmunt Mcburney 06/08/20    Lab slips mailed

## 2020-07-01 DIAGNOSIS — E05.90 HYPERTHYROIDISM: ICD-10-CM

## 2020-07-08 LAB
CREATININE, EXTERNAL: 9
LDL-C, EXTERNAL: 76

## 2021-03-31 DIAGNOSIS — E05.90 HYPERTHYROIDISM: Primary | ICD-10-CM

## 2021-05-05 DIAGNOSIS — E05.90 HYPERTHYROIDISM: ICD-10-CM

## 2021-05-05 DIAGNOSIS — E05.80 AMIODARONE-INDUCED HYPERTHYROIDISM: ICD-10-CM

## 2021-05-05 DIAGNOSIS — T46.2X5A AMIODARONE-INDUCED HYPERTHYROIDISM: ICD-10-CM

## 2021-05-07 RX ORDER — METHIMAZOLE 10 MG/1
TABLET ORAL
Qty: 30 TAB | Refills: 0 | Status: SHIPPED | OUTPATIENT
Start: 2021-05-07 | End: 2021-06-04

## 2021-06-03 DIAGNOSIS — T46.2X5A AMIODARONE-INDUCED HYPERTHYROIDISM: ICD-10-CM

## 2021-06-03 DIAGNOSIS — E05.90 HYPERTHYROIDISM: ICD-10-CM

## 2021-06-03 DIAGNOSIS — E05.80 AMIODARONE-INDUCED HYPERTHYROIDISM: ICD-10-CM

## 2021-06-04 RX ORDER — METHIMAZOLE 10 MG/1
TABLET ORAL
Qty: 30 TABLET | Refills: 0 | Status: SHIPPED | OUTPATIENT
Start: 2021-06-04 | End: 2021-07-05

## 2021-07-02 ENCOUNTER — HOSPITAL ENCOUNTER (INPATIENT)
Age: 71
LOS: 1 days | Discharge: HOME | DRG: 291 | End: 2021-07-03
Attending: HOSPITALIST | Admitting: HOSPITALIST
Payer: MEDICARE

## 2021-07-02 ENCOUNTER — APPOINTMENT (OUTPATIENT)
Dept: GENERAL RADIOLOGY | Age: 71
DRG: 291 | End: 2021-07-02
Attending: PHYSICIAN ASSISTANT
Payer: MEDICARE

## 2021-07-02 DIAGNOSIS — I50.9 ACUTE ON CHRONIC CONGESTIVE HEART FAILURE, UNSPECIFIED HEART FAILURE TYPE (HCC): Primary | ICD-10-CM

## 2021-07-02 DIAGNOSIS — E04.9 GOITER: ICD-10-CM

## 2021-07-02 DIAGNOSIS — E05.90 HYPERTHYROIDISM: ICD-10-CM

## 2021-07-02 DIAGNOSIS — J96.11 CHRONIC HYPOXEMIC RESPIRATORY FAILURE (HCC): ICD-10-CM

## 2021-07-02 DIAGNOSIS — R06.89 HYPERCAPNIA: ICD-10-CM

## 2021-07-02 LAB
ALBUMIN SERPL-MCNC: 3.4 G/DL (ref 3.5–5)
ALBUMIN/GLOB SERPL: 1 {RATIO} (ref 1.1–2.2)
ALP SERPL-CCNC: 92 U/L (ref 45–117)
ALT SERPL-CCNC: 18 U/L (ref 12–78)
ANION GAP SERPL CALC-SCNC: 0 MMOL/L (ref 5–15)
AST SERPL W P-5'-P-CCNC: 13 U/L (ref 15–37)
ATRIAL RATE: 91 BPM
BASE EXCESS BLDV CALC-SCNC: 15.4 MMOL/L (ref 0–2)
BASOPHILS # BLD: 0 K/UL (ref 0–0.1)
BASOPHILS NFR BLD: 1 % (ref 0–1)
BDY SITE: ABNORMAL
BILIRUB SERPL-MCNC: 0.5 MG/DL (ref 0.2–1)
BNP SERPL-MCNC: 5714 PG/ML
BUN SERPL-MCNC: 18 MG/DL (ref 6–20)
BUN/CREAT SERPL: 13 (ref 12–20)
CA-I BLD-MCNC: 7.7 MG/DL (ref 8.5–10.1)
CALCULATED R AXIS, ECG10: -55 DEGREES
CALCULATED T AXIS, ECG11: 101 DEGREES
CHLORIDE SERPL-SCNC: 98 MMOL/L (ref 97–108)
CO2 SERPL-SCNC: 42 MMOL/L (ref 21–32)
CREAT SERPL-MCNC: 1.44 MG/DL (ref 0.55–1.02)
DIAGNOSIS, 93000: NORMAL
DIFFERENTIAL METHOD BLD: ABNORMAL
EOSINOPHIL # BLD: 0.1 K/UL (ref 0–0.4)
EOSINOPHIL NFR BLD: 1 % (ref 0–7)
ERYTHROCYTE [DISTWIDTH] IN BLOOD BY AUTOMATED COUNT: 14.2 % (ref 11.5–14.5)
FIO2 ON VENT: 21 %
GLOBULIN SER CALC-MCNC: 3.5 G/DL (ref 2–4)
GLUCOSE SERPL-MCNC: 86 MG/DL (ref 65–100)
HCO3 BLDV-SCNC: 39 MMOL/L (ref 22–26)
HCT VFR BLD AUTO: 40.1 % (ref 35–47)
HGB BLD-MCNC: 11.8 G/DL (ref 11.5–16)
IMM GRANULOCYTES # BLD AUTO: 0 K/UL (ref 0–0.04)
IMM GRANULOCYTES NFR BLD AUTO: 0 % (ref 0–0.5)
LYMPHOCYTES # BLD: 1 K/UL (ref 0.8–3.5)
LYMPHOCYTES NFR BLD: 14 % (ref 12–49)
MCH RBC QN AUTO: 27.1 PG (ref 26–34)
MCHC RBC AUTO-ENTMCNC: 29.4 G/DL (ref 30–36.5)
MCV RBC AUTO: 92.2 FL (ref 80–99)
MONOCYTES # BLD: 0.6 K/UL (ref 0–1)
MONOCYTES NFR BLD: 8 % (ref 5–13)
NEUTS SEG # BLD: 5.3 K/UL (ref 1.8–8)
NEUTS SEG NFR BLD: 76 % (ref 32–75)
NRBC # BLD: 0 K/UL (ref 0–0.01)
NRBC BLD-RTO: 0 PER 100 WBC
PCO2 BLDV: 82.6 MMHG (ref 40–50)
PH BLDV: 7.34 [PH] (ref 7.31–7.41)
PLATELET # BLD AUTO: 223 K/UL (ref 150–400)
PMV BLD AUTO: 9.8 FL (ref 8.9–12.9)
PO2 BLDV: 40 MMHG (ref 36–42)
POTASSIUM SERPL-SCNC: 3.5 MMOL/L (ref 3.5–5.1)
PROT SERPL-MCNC: 6.9 G/DL (ref 6.4–8.2)
Q-T INTERVAL, ECG07: 416 MS
QRS DURATION, ECG06: 130 MS
QTC CALCULATION (BEZET), ECG08: 491 MS
RBC # BLD AUTO: 4.35 M/UL (ref 3.8–5.2)
SAO2 % BLDV: 74 % (ref 60–80)
SAO2% DEVICE SAO2% SENSOR NAME: ABNORMAL
SODIUM SERPL-SCNC: 140 MMOL/L (ref 136–145)
TROPONIN I SERPL-MCNC: <0.05 NG/ML
VENTRICULAR RATE, ECG03: 84 BPM
WBC # BLD AUTO: 7 K/UL (ref 3.6–11)

## 2021-07-02 PROCEDURE — 80053 COMPREHEN METABOLIC PANEL: CPT

## 2021-07-02 PROCEDURE — 71045 X-RAY EXAM CHEST 1 VIEW: CPT

## 2021-07-02 PROCEDURE — 74011250637 HC RX REV CODE- 250/637: Performed by: HOSPITALIST

## 2021-07-02 PROCEDURE — 85025 COMPLETE CBC W/AUTO DIFF WBC: CPT

## 2021-07-02 PROCEDURE — 74011250636 HC RX REV CODE- 250/636: Performed by: HOSPITALIST

## 2021-07-02 PROCEDURE — 82803 BLOOD GASES ANY COMBINATION: CPT

## 2021-07-02 PROCEDURE — 65270000029 HC RM PRIVATE

## 2021-07-02 PROCEDURE — 83880 ASSAY OF NATRIURETIC PEPTIDE: CPT

## 2021-07-02 PROCEDURE — 74011250636 HC RX REV CODE- 250/636: Performed by: PHYSICIAN ASSISTANT

## 2021-07-02 PROCEDURE — 99285 EMERGENCY DEPT VISIT HI MDM: CPT

## 2021-07-02 PROCEDURE — 93005 ELECTROCARDIOGRAM TRACING: CPT

## 2021-07-02 PROCEDURE — 84484 ASSAY OF TROPONIN QUANT: CPT

## 2021-07-02 PROCEDURE — 36415 COLL VENOUS BLD VENIPUNCTURE: CPT

## 2021-07-02 RX ORDER — HEPARIN SODIUM 5000 [USP'U]/ML
5000 INJECTION, SOLUTION INTRAVENOUS; SUBCUTANEOUS EVERY 8 HOURS
Status: DISCONTINUED | OUTPATIENT
Start: 2021-07-02 | End: 2021-07-02

## 2021-07-02 RX ORDER — DIGOXIN 125 MCG
0.12 TABLET ORAL DAILY
Status: DISCONTINUED | OUTPATIENT
Start: 2021-07-03 | End: 2021-07-02

## 2021-07-02 RX ORDER — LISINOPRIL 5 MG/1
5 TABLET ORAL DAILY
Status: DISCONTINUED | OUTPATIENT
Start: 2021-07-03 | End: 2021-07-03 | Stop reason: HOSPADM

## 2021-07-02 RX ORDER — AMIODARONE HYDROCHLORIDE 200 MG/1
200 TABLET ORAL DAILY
Status: DISCONTINUED | OUTPATIENT
Start: 2021-07-03 | End: 2021-07-03 | Stop reason: HOSPADM

## 2021-07-02 RX ORDER — FUROSEMIDE 10 MG/ML
40 INJECTION INTRAMUSCULAR; INTRAVENOUS
Status: COMPLETED | OUTPATIENT
Start: 2021-07-02 | End: 2021-07-02

## 2021-07-02 RX ORDER — METHIMAZOLE 5 MG/1
10 TABLET ORAL DAILY
Status: DISCONTINUED | OUTPATIENT
Start: 2021-07-03 | End: 2021-07-03 | Stop reason: HOSPADM

## 2021-07-02 RX ORDER — ATORVASTATIN CALCIUM 20 MG/1
20 TABLET, FILM COATED ORAL DAILY
Status: DISCONTINUED | OUTPATIENT
Start: 2021-07-03 | End: 2021-07-03 | Stop reason: HOSPADM

## 2021-07-02 RX ORDER — FUROSEMIDE 10 MG/ML
40 INJECTION INTRAMUSCULAR; INTRAVENOUS 2 TIMES DAILY
Status: DISCONTINUED | OUTPATIENT
Start: 2021-07-02 | End: 2021-07-03 | Stop reason: HOSPADM

## 2021-07-02 RX ADMIN — FUROSEMIDE 40 MG: 10 INJECTION, SOLUTION INTRAMUSCULAR; INTRAVENOUS at 22:18

## 2021-07-02 RX ADMIN — APIXABAN 5 MG: 5 TABLET, FILM COATED ORAL at 22:18

## 2021-07-02 RX ADMIN — FUROSEMIDE 40 MG: 10 INJECTION, SOLUTION INTRAVENOUS at 18:33

## 2021-07-02 NOTE — ED PROVIDER NOTES
EMERGENCY DEPARTMENT HISTORY AND PHYSICAL EXAM      Date: 7/2/2021  Patient Name: Emerald Spence    History of Presenting Illness     Chief Complaint   Patient presents with    Shortness of Breath     w/ exertion x 1 wk       History Provided By: Patient    HPI: Emerald Spence, 72 y.o. female with a past medical history significant for a-fib, CHF, COPD who presents to the ED with cc of gradual onset/worsening shortness of breath with exertion x1 week. He states he symptoms include worsening bilateral lower extremity swelling. Symptoms exacerbated with exertion. Alleviated with rest.  States that she was previously on 2 L nasal cannula O2 chronically continuously and \"a while ago\" her pulmonologist allowed her to decrease to only nighttime use. Patient has not been wearing O2 continuously at home. Also states that she was previously on Lasix 80 mg daily and over the last 2 weeks she decreased to 40 mg per recommendation of her cardiologist.  Has been compliant with this medication regimen. Patient denies fever, chills, chest pain, cough, hemoptysis, nausea, vomiting, diarrhea    There are no other complaints, changes, or physical findings at this time. PCP: Leon Molina MD    No current facility-administered medications on file prior to encounter. Current Outpatient Medications on File Prior to Encounter   Medication Sig Dispense Refill    methIMAzole (TAPAZOLE) 10 mg tablet TAKE ONE TABLET BY MOUTH EVERY DAY 30 Tablet 0    atorvastatin (LIPITOR) 20 mg tablet TAKE 1 TABLET BY MOUTH EVERY DAY      ergocalciferol (ERGOCALCIFEROL) 50,000 unit capsule TAKE 1 CAPSULE BY MOUTH ONE TIME PER WEEK      ferrous sulfate (IRON PO) Take  by mouth.  digoxin (LANOXIN) 0.125 mg tablet Take 0.125 mg by mouth daily.  lisinopril (PRINIVIL, ZESTRIL) 5 mg tablet Take 5 mg by mouth daily.  amiodarone (CORDARONE) 200 mg tablet Take 200 mg by mouth daily.       furosemide (LASIX) 80 mg tablet Take 80 mg by mouth daily.      ELIQUIS 5 mg tablet Take 10 mg by mouth daily. Past History     Past Medical History:  Past Medical History:   Diagnosis Date    Atrial fibrillation ([facility] [address]     Cardiomyopathy ([facility] [address]     Chronic systolic heart failure (HCC)     COPD (chronic obstructive pulmonary disease) (HCC)     Goiter     Histoplasmosis     Hypertension        Past Surgical History:  Past Surgical History:   Procedure Laterality Date    HX CHOLECYSTECTOMY      HX HYSTERECTOMY  1994       Family History:  Family History   Problem Relation Age of Onset    Heart Attack Mother     Lung Cancer Father        Social History:  Social History     Tobacco Use    Smoking status: Current Every Day Smoker     Packs/day: 0.50    Smokeless tobacco: Never Used   Substance Use Topics    Alcohol use: No    Drug use: No       Allergies:  No Known Allergies      Review of Systems     Review of Systems   Constitutional: Negative for chills, fatigue and fever. HENT: Negative. Respiratory: Positive for shortness of breath. Negative for cough, chest tightness and wheezing. Cardiovascular: Positive for leg swelling. Negative for chest pain and palpitations. Gastrointestinal: Negative for abdominal pain, diarrhea, nausea and vomiting. Genitourinary: Negative for frequency and urgency. Musculoskeletal: Negative for back pain, neck pain and neck stiffness. Skin: Negative for rash. Neurological: Negative for dizziness, weakness, light-headedness and headaches. Psychiatric/Behavioral: Negative. All other systems reviewed and are negative. Physical Exam     Physical Exam  Vitals and nursing note reviewed. Constitutional:       General: She is not in acute distress. Appearance: Normal appearance. She is well-developed. She is obese. She is not ill-appearing, toxic-appearing or diaphoretic. HENT:      Head: Normocephalic and atraumatic. Nose: Nose normal. No congestion or rhinorrhea.       Mouth/Throat: Mouth: Mucous membranes are moist.      Pharynx: Oropharynx is clear. No oropharyngeal exudate or posterior oropharyngeal erythema. Eyes:      General: No scleral icterus. Conjunctiva/sclera: Conjunctivae normal.      Pupils: Pupils are equal, round, and reactive to light. Cardiovascular:      Rate and Rhythm: Normal rate. Rhythm irregularly irregular. Pulses:           Radial pulses are 2+ on the right side and 2+ on the left side. Dorsalis pedis pulses are 2+ on the right side and 2+ on the left side. Heart sounds: No murmur heard. No friction rub. No gallop. Pulmonary:      Effort: Pulmonary effort is normal. No tachypnea, accessory muscle usage, respiratory distress or retractions. Breath sounds: No stridor. Examination of the right-lower field reveals rales. Examination of the left-lower field reveals rales. Rales present. No decreased breath sounds, wheezing or rhonchi. Comments: On 4L NC with SpO2 96%  Speaking in complete sentences  Chest:      Chest wall: No tenderness. Abdominal:      General: Bowel sounds are normal. There is no distension. Palpations: Abdomen is soft. There is no mass. Tenderness: There is no abdominal tenderness. There is no right CVA tenderness, left CVA tenderness, guarding or rebound. Musculoskeletal:         General: No deformity. Normal range of motion. Cervical back: Normal range of motion and neck supple. No rigidity. No muscular tenderness. Right lower le+ Pitting Edema present. Left lower le+ Pitting Edema present. Skin:     General: Skin is warm and dry. Capillary Refill: Capillary refill takes less than 2 seconds. Coloration: Skin is not jaundiced or pale. Findings: No bruising, erythema or rash. Neurological:      General: No focal deficit present. Mental Status: She is alert and oriented to person, place, and time. Mental status is at baseline.       Sensory: Sensation is intact. Motor: Motor function is intact. Psychiatric:         Mood and Affect: Mood normal.         Behavior: Behavior normal. Behavior is cooperative. Thought Content: Thought content normal.         Judgment: Judgment normal.         Lab and Diagnostic Study Results     Labs -     Recent Results (from the past 12 hour(s))   EKG, 12 LEAD, INITIAL    Collection Time: 07/02/21  4:33 PM   Result Value Ref Range    Ventricular Rate 84 BPM    Atrial Rate 91 BPM    QRS Duration 130 ms    Q-T Interval 416 ms    QTC Calculation (Bezet) 491 ms    Calculated R Axis -55 degrees    Calculated T Axis 101 degrees    Diagnosis       Atrial fibrillation  Left axis deviation  Left bundle branch block  Abnormal ECG  When compared with ECG of 25-OCT-2019 20:57,  Left bundle branch block is now Present  Confirmed by Caty Search ([id number]) on 7/2/2021 5:06:29 PM     CBC WITH AUTOMATED DIFF    Collection Time: 07/02/21  4:42 PM   Result Value Ref Range    WBC 7.0 3.6 - 11.0 K/uL    RBC 4.35 3.80 - 5.20 M/uL    HGB 11.8 11.5 - 16.0 g/dL    HCT 40.1 35.0 - 47.0 %    MCV 92.2 80.0 - 99.0 FL    MCH 27.1 26.0 - 34.0 PG    MCHC 29.4 (L) 30.0 - 36.5 g/dL    RDW 14.2 11.5 - 14.5 %    PLATELET 465 508 - 451 K/uL    MPV 9.8 8.9 - 12.9 FL    NRBC 0.0 0.0  WBC    ABSOLUTE NRBC 0.00 0.00 - 0.01 K/uL    NEUTROPHILS 76 (H) 32 - 75 %    LYMPHOCYTES 14 12 - 49 %    MONOCYTES 8 5 - 13 %    EOSINOPHILS 1 0 - 7 %    BASOPHILS 1 0 - 1 %    IMMATURE GRANULOCYTES 0 0 - 0.5 %    ABS. NEUTROPHILS 5.3 1.8 - 8.0 K/UL    ABS. LYMPHOCYTES 1.0 0.8 - 3.5 K/UL    ABS. MONOCYTES 0.6 0.0 - 1.0 K/UL    ABS. EOSINOPHILS 0.1 0.0 - 0.4 K/UL    ABS. BASOPHILS 0.0 0.0 - 0.1 K/UL    ABS. IMM.  GRANS. 0.0 0.00 - 0.04 K/UL    DF AUTOMATED     METABOLIC PANEL, COMPREHENSIVE    Collection Time: 07/02/21  4:42 PM   Result Value Ref Range    Sodium 140 136 - 145 mmol/L    Potassium 3.5 3.5 - 5.1 mmol/L    Chloride 98 97 - 108 mmol/L    CO2 42 (HH) 21 - 32 mmol/L Anion gap 0 (L) 5 - 15 mmol/L    Glucose 86 65 - 100 mg/dL    BUN 18 6 - 20 mg/dL    Creatinine 1.44 (H) 0.55 - 1.02 mg/dL    BUN/Creatinine ratio 13 12 - 20      GFR est AA 44 (L) >60 ml/min/1.73m2    GFR est non-AA 36 (L) >60 ml/min/1.73m2    Calcium 7.7 (L) 8.5 - 10.1 mg/dL    Bilirubin, total 0.5 0.2 - 1.0 mg/dL    AST (SGOT) 13 (L) 15 - 37 U/L    ALT (SGPT) 18 12 - 78 U/L    Alk. phosphatase 92 45 - 117 U/L    Protein, total 6.9 6.4 - 8.2 g/dL    Albumin 3.4 (L) 3.5 - 5.0 g/dL    Globulin 3.5 2.0 - 4.0 g/dL    A-G Ratio 1.0 (L) 1.1 - 2.2     TROPONIN I    Collection Time: 07/02/21  4:42 PM   Result Value Ref Range    Troponin-I, Qt. <0.05 <0.05 ng/mL   BNP    Collection Time: 07/02/21  4:42 PM   Result Value Ref Range    NT pro-BNP 5,714 (H) <125 pg/mL       Radiologic Studies -   CXR Results  (Last 48 hours)               07/02/21 1655  XR CHEST PORT Final result    Impression:  Bilateral small pleural effusions with basal opacity some of which   appears to be nodular, atelectasis, and/or airspace disease/edema. Narrative:  EXAM: XR CHEST PORT       INDICATION: sob       COMPARISON: October 25, 2019       FINDINGS: Enlarged cardiomediastinal contours again noted. Pulmonary congestion. Bilateral small pleural effusions with basal opacity. No pneumothorax. No acute   fracture or dislocation. Medical Decision Making   - I am the first provider for this patient. - I reviewed the vital signs, available nursing notes, past medical history, past surgical history, family history and social history. - Initial assessment performed. The patients presenting problems have been discussed, and they are in agreement with the care plan formulated and outlined with them. I have encouraged them to ask questions as they arise throughout their visit. Vital Signs-Reviewed the patient's vital signs.   Patient Vitals for the past 12 hrs:   Temp Pulse Resp BP SpO2   07/02/21 1700 -- -- -- -- 97 %   07/02/21 1659 -- 88 22 138/83 97 %   07/02/21 1633 -- 74 18 -- 98 %   07/02/21 1625 98.4 °F (36.9 °C) 84 20 (!) 149/76 (!) 88 %     EKG interpretation: (Preliminary) 1633  Rhythm: a-fib; and irregular . Rate (approx.): 84; Axis: LAD; P wave: absent; QRS interval: normal ; ST/T wave: normal; LBBB      Records Reviewed: Nursing Notes and Old Medical Records    The patient presents with shortness of breath with a differential diagnosis of CHF exacerbation, ACS, PNA, acute on chronic hypoxemic respiratory failure, fluid overload      ED Course:     ED Course as of Jul 02 1750 Fri Jul 02, 2021   1739 CONSULT NOTE:  Consultant: Dr. Dejuan Hill  Specialty: Hospitalist  Discussed pt's history, disposition, and available diagnostic and imaging results. Reviewed care plans. Patient will be admitted to the hospital for further management. MAGGIE Fernandez    [NO]      ED Course User Index  [NO] MAGGIE Atsorga       Provider Notes (Medical Decision Making):     MDM  Number of Diagnoses or Management Options  Acute on chronic congestive heart failure, unspecified heart failure type ([address]  Chronic hypoxemic respiratory failure (HCC)  Hypercapnia  Diagnosis management comments:     27-year-old female with history of CHF and COPD, on O2 previously continuously but now only using it at nighttime. Upon EMS arrival patient was not wearing O2 and SPO2 was 88% on room air. Here in the ED patient was placed on 4 L nasal cannula and SPO2 96%. Troponin negative. EKG nonischemic. BNP elevated 5714. CO2 42. X-ray with pleural effusions. Will admit to hospital for CHF exacerbation and hypercapnia. Patient agreeable with plan of care.        Amount and/or Complexity of Data Reviewed  Clinical lab tests: ordered and reviewed  Tests in the radiology section of CPT®: ordered and reviewed  Review and summarize past medical records: yes  Discuss the patient with other providers: yes  Independent visualization of images, tracings, or specimens: yes    Patient Progress  Patient progress: stable             Disposition   Disposition: Admit to hospital    Admitted      Diagnosis     Clinical Impression:   1. Acute on chronic congestive heart failure, unspecified heart failure type ([address]    2. Hypercapnia    3. Chronic hypoxemic respiratory failure ([facility] [address]        Attestations:    MAGGIE Duval    Please note that this dictation was completed with [employer], the computer voice recognition software. Quite often unanticipated grammatical, syntax, homophones, and other interpretive errors are inadvertently transcribed by the computer software. Please disregard these errors. Please excuse any errors that have escaped final proofreading. Thank you.

## 2021-07-02 NOTE — ED TRIAGE NOTES
Transported by EMS from home endorsing SOB w/ mild exertion x 1wk. Pt states she is prescribed home O2 that she should be utilizing 24/7, but is only using O2 at night.

## 2021-07-02 NOTE — H&P
History and Physical    Subjective:   Chief Complaint : shortness of breath since 1 week  Source of information : patient     History of present illness:     70F, H/o pAFIB on AC , hyperthyroidism, CHF unknown EF and probable ALEXYS with shortness of breath since 1 week    She states mostly on exertion, relieved on rest, associated with cough and orthopnea and lower extremity edema    She states she hasnt been using oxygen as prescribed and presented to ER. Today, she was so short of breath that she wasn't able to get out of chair and presented here    ER: pulmonary edema, lasix, 4L        Past Medical History:   Diagnosis Date    Atrial fibrillation ([address]     Cardiomyopathy ([facility] [address]     Chronic systolic heart failure ([facility] [address]     COPD (chronic obstructive pulmonary disease) ([facility] [address]     Goiter     Histoplasmosis     Hypertension      Past Surgical History:   Procedure Laterality Date    HX CHOLECYSTECTOMY      HX HYSTERECTOMY  1994     Family History   Problem Relation Age of Onset    Heart Attack Mother     Lung Cancer Father       Social History     Tobacco Use    Smoking status: Current Every Day Smoker     Packs/day: 0.50    Smokeless tobacco: Never Used   Substance Use Topics    Alcohol use: No       Prior to Admission medications    Medication Sig Start Date End Date Taking? Authorizing Provider   methIMAzole (TAPAZOLE) 10 mg tablet TAKE ONE TABLET BY MOUTH EVERY DAY 6/4/21   Liliana Christian MD   atorvastatin (LIPITOR) 20 mg tablet TAKE 1 TABLET BY MOUTH EVERY DAY 12/21/19   Provider, Historical   ergocalciferol (ERGOCALCIFEROL) 50,000 unit capsule TAKE 1 CAPSULE BY MOUTH ONE TIME PER WEEK 12/21/19   Provider, Historical   ferrous sulfate (IRON PO) Take  by mouth. Provider, Historical   digoxin (LANOXIN) 0.125 mg tablet Take 0.125 mg by mouth daily. 8/21/18   Provider, Historical   lisinopril (PRINIVIL, ZESTRIL) 5 mg tablet Take 5 mg by mouth daily.  7/21/18   Provider, Historical amiodarone (CORDARONE) 200 mg tablet Take 200 mg by mouth daily. 8/31/18   Provider, Historical   furosemide (LASIX) 80 mg tablet Take 80 mg by mouth daily. 7/21/18   Provider, Historical   ELIQUIS 5 mg tablet Take 10 mg by mouth daily. 8/31/18   Provider, Historical     No Known Allergies          Review of Systems:  Review of Systems   Constitutional: Negative for chills, fatigue and fever. HENT: Negative. Respiratory: Positive for shortness of breath. Negative for cough, chest tightness and wheezing. Cardiovascular: Positive for leg swelling. Negative for chest pain and palpitations. Gastrointestinal: Negative for abdominal pain, diarrhea, nausea and vomiting. Genitourinary: Negative for frequency and urgency. Musculoskeletal: Negative for back pain, neck pain and neck stiffness. Skin: Negative for rash. Neurological: Negative for dizziness, weakness, light-headedness and headaches. Psychiatric/Behavioral: Negative. All other systems reviewed and are negative. Vitals:     Visit Vitals  /83   Pulse 88   Temp 98.4 °F (36.9 °C)   Resp 22   Ht 5' 4\" (1.626 m)   Wt 81.6 kg (180 lb)   SpO2 97%   BMI 30.90 kg/m²       Physical Exam:   Physical Exam  Vitals and nursing note reviewed. Constitutional:       General: She is not in acute distress. Appearance: Normal appearance. She is well-developed. She is obese. She is not ill-appearing, toxic-appearing or diaphoretic. HENT:      Head: Normocephalic and atraumatic. Nose: Nose normal. No congestion or rhinorrhea. Mouth/Throat:      Mouth: Mucous membranes are moist.      Pharynx: Oropharynx is clear. No oropharyngeal exudate or posterior oropharyngeal erythema. Eyes:      General: No scleral icterus. Conjunctiva/sclera: Conjunctivae normal.      Pupils: Pupils are equal, round, and reactive to light. Cardiovascular:      Rate and Rhythm: Normal rate. Rhythm irregularly irregular.       Pulses:           Radial pulses are 2+ on the right side and 2+ on the left side. Dorsalis pedis pulses are 2+ on the right side and 2+ on the left side. Heart sounds: No murmur heard. No friction rub. No gallop. Pulmonary:      Effort: Pulmonary effort is normal. No tachypnea, accessory muscle usage, respiratory distress or retractions. Breath sounds: No stridor. Examination of the right-lower field reveals rales. Examination of the left-lower field reveals rales. Rales present. No decreased breath sounds, wheezing or rhonchi. Comments: On 4L NC with SpO2 96%  Speaking in complete sentences  Chest:      Chest wall: No tenderness. Abdominal:      General: Bowel sounds are normal. There is no distension. Palpations: Abdomen is soft. There is no mass. Tenderness: There is no abdominal tenderness. There is no right CVA tenderness, left CVA tenderness, guarding or rebound. Musculoskeletal:         General: No deformity. Normal range of motion. Cervical back: Normal range of motion and neck supple. No rigidity. No muscular tenderness. Right lower le+ Pitting Edema present. Left lower le+ Pitting Edema present. Skin:     General: Skin is warm and dry. Capillary Refill: Capillary refill takes less than 2 seconds. Coloration: Skin is not jaundiced or pale. Findings: No bruising, erythema or rash. Neurological:      General: No focal deficit present. Mental Status: She is alert and oriented to person, place, and time. Mental status is at baseline. Sensory: Sensation is intact. Motor: Motor function is intact. Psychiatric:         Mood and Affect: Mood normal.         Behavior: Behavior normal. Behavior is cooperative. Thought Content:  Thought content normal.         Judgment: Judgment normal.         Data Review:   Recent Results (from the past 24 hour(s))   EKG, 12 LEAD, INITIAL    Collection Time: 21  4:33 PM   Result Value Ref Range Ventricular Rate 84 BPM    Atrial Rate 91 BPM    QRS Duration 130 ms    Q-T Interval 416 ms    QTC Calculation (Bezet) 491 ms    Calculated R Axis -55 degrees    Calculated T Axis 101 degrees    Diagnosis       Atrial fibrillation  Left axis deviation  Left bundle branch block  Abnormal ECG  When compared with ECG of 25-OCT-2019 20:57,  Left bundle branch block is now Present  Confirmed by Kira Quiñones ([id number]) on 7/2/2021 5:06:29 PM     CBC WITH AUTOMATED DIFF    Collection Time: 07/02/21  4:42 PM   Result Value Ref Range    WBC 7.0 3.6 - 11.0 K/uL    RBC 4.35 3.80 - 5.20 M/uL    HGB 11.8 11.5 - 16.0 g/dL    HCT 40.1 35.0 - 47.0 %    MCV 92.2 80.0 - 99.0 FL    MCH 27.1 26.0 - 34.0 PG    MCHC 29.4 (L) 30.0 - 36.5 g/dL    RDW 14.2 11.5 - 14.5 %    PLATELET 340 963 - 008 K/uL    MPV 9.8 8.9 - 12.9 FL    NRBC 0.0 0.0  WBC    ABSOLUTE NRBC 0.00 0.00 - 0.01 K/uL    NEUTROPHILS 76 (H) 32 - 75 %    LYMPHOCYTES 14 12 - 49 %    MONOCYTES 8 5 - 13 %    EOSINOPHILS 1 0 - 7 %    BASOPHILS 1 0 - 1 %    IMMATURE GRANULOCYTES 0 0 - 0.5 %    ABS. NEUTROPHILS 5.3 1.8 - 8.0 K/UL    ABS. LYMPHOCYTES 1.0 0.8 - 3.5 K/UL    ABS. MONOCYTES 0.6 0.0 - 1.0 K/UL    ABS. EOSINOPHILS 0.1 0.0 - 0.4 K/UL    ABS. BASOPHILS 0.0 0.0 - 0.1 K/UL    ABS. IMM. GRANS. 0.0 0.00 - 0.04 K/UL    DF AUTOMATED     METABOLIC PANEL, COMPREHENSIVE    Collection Time: 07/02/21  4:42 PM   Result Value Ref Range    Sodium 140 136 - 145 mmol/L    Potassium 3.5 3.5 - 5.1 mmol/L    Chloride 98 97 - 108 mmol/L    CO2 42 (HH) 21 - 32 mmol/L    Anion gap 0 (L) 5 - 15 mmol/L    Glucose 86 65 - 100 mg/dL    BUN 18 6 - 20 mg/dL    Creatinine 1.44 (H) 0.55 - 1.02 mg/dL    BUN/Creatinine ratio 13 12 - 20      GFR est AA 44 (L) >60 ml/min/1.73m2    GFR est non-AA 36 (L) >60 ml/min/1.73m2    Calcium 7.7 (L) 8.5 - 10.1 mg/dL    Bilirubin, total 0.5 0.2 - 1.0 mg/dL    AST (SGOT) 13 (L) 15 - 37 U/L    ALT (SGPT) 18 12 - 78 U/L    Alk.  phosphatase 92 45 - 117 U/L Protein, total 6.9 6.4 - 8.2 g/dL    Albumin 3.4 (L) 3.5 - 5.0 g/dL    Globulin 3.5 2.0 - 4.0 g/dL    A-G Ratio 1.0 (L) 1.1 - 2.2     TROPONIN I    Collection Time: 07/02/21  4:42 PM   Result Value Ref Range    Troponin-I, Qt. <0.05 <0.05 ng/mL   BNP    Collection Time: 07/02/21  4:42 PM   Result Value Ref Range    NT pro-BNP 5,714 (H) <125 pg/mL             Assessment and Plan :       (1) Acute hypoxic respiratory failure : 4L, baseline 2L nocturnal.wean.      (2) AECHF unknwon EF: lasix 40 BID, ECHO:    (3) JENNIFER s/t cardiorenal, continue lasix    (4) pAFIB: dig, amiodarone and elquis    (5) HTN: continue lisinopril,     (6) HLD: statin    (7) hyperthyroidism: methimazole    DVT ppx: eliquis    DISPO: home if on 2 L     Signed By: Francia Soto MD     July 2, 2021

## 2021-07-03 ENCOUNTER — APPOINTMENT (OUTPATIENT)
Dept: NON INVASIVE DIAGNOSTICS | Age: 71
DRG: 291 | End: 2021-07-03
Attending: HOSPITALIST
Payer: MEDICARE

## 2021-07-03 VITALS
RESPIRATION RATE: 16 BRPM | TEMPERATURE: 98 F | HEIGHT: 65 IN | WEIGHT: 232.59 LBS | OXYGEN SATURATION: 98 % | BODY MASS INDEX: 38.75 KG/M2 | SYSTOLIC BLOOD PRESSURE: 115 MMHG | HEART RATE: 72 BPM | DIASTOLIC BLOOD PRESSURE: 54 MMHG

## 2021-07-03 PROCEDURE — 74011250636 HC RX REV CODE- 250/636: Performed by: HOSPITALIST

## 2021-07-03 PROCEDURE — 74011250637 HC RX REV CODE- 250/637: Performed by: HOSPITALIST

## 2021-07-03 RX ORDER — FUROSEMIDE 80 MG/1
80 TABLET ORAL DAILY
Qty: 30 TABLET | Refills: 2 | Status: SHIPPED | OUTPATIENT
Start: 2021-07-03

## 2021-07-03 RX ADMIN — AMIODARONE HYDROCHLORIDE 200 MG: 200 TABLET ORAL at 09:43

## 2021-07-03 RX ADMIN — FUROSEMIDE 40 MG: 10 INJECTION, SOLUTION INTRAMUSCULAR; INTRAVENOUS at 09:43

## 2021-07-03 RX ADMIN — APIXABAN 5 MG: 5 TABLET, FILM COATED ORAL at 09:43

## 2021-07-03 RX ADMIN — ATORVASTATIN CALCIUM 20 MG: 20 TABLET, FILM COATED ORAL at 09:43

## 2021-07-03 RX ADMIN — METHIMAZOLE 10 MG: 5 TABLET ORAL at 09:42

## 2021-07-03 NOTE — PROGRESS NOTES
Patient scheduled to discharge home today. Cleared to discharge from SW standpoint.         Christin Richter, MSW

## 2021-07-03 NOTE — PROGRESS NOTES
Admission Skin Assessment- Two nurse skin assessment performed by Katiana Stephens and EDENILSON Red. No open areas noted.

## 2021-07-03 NOTE — DISCHARGE SUMMARY
Physician Discharge Summary     Patient ID:    Esa Rivera  [id number]  51 y.o.  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      (2) AECHF unknwon EF: lasix 40 BID, ECHO:     (3) JENNIFER s/t cardiorenal, continue lasix     (4) pAFIB: dig, amiodarone and elquis     (5) HTN: continue lisinopril,      (6) HLD: statin     (7) hyperthyroidism: methimazole        Hospital Course:     70F, H/o pAFIB on AC , hyperthyroidism, CHF unknown EF and probable ALEXYS with shortness of breath since 1 week s/t AECHF unknwon EF    During the course of her stay, she was started with lasix and was planned for discharge          INSTRUCTIONS ON DISCHARGE    (1) please continue to take lasix 80mg daily     (2) F/u with your cardiologist and pulmonologist in 1 week. (3) please wear oxygen to keep saturation > 92%            Discharge Medications:   Current Discharge Medication List      CONTINUE these medications which have CHANGED    Details   furosemide (LASIX) 80 mg tablet Take 1 Tablet by mouth daily.   Qty: 30 Tablet, Refills: 2  Start date: 7/3/2021    Associated Diagnoses: Goiter; Hyperthyroidism         CONTINUE these medications which have NOT CHANGED    Details   methIMAzole (TAPAZOLE) 10 mg tablet TAKE ONE TABLET BY MOUTH EVERY DAY  Qty: 30 Tablet, Refills: 0    Associated Diagnoses: Hyperthyroidism; Amiodarone-induced hyperthyroidism      atorvastatin (LIPITOR) 20 mg tablet TAKE 1 TABLET BY MOUTH EVERY DAY      ergocalciferol (ERGOCALCIFEROL) 50,000 unit capsule TAKE 1 CAPSULE BY MOUTH ONE TIME PER WEEK      ferrous sulfate (IRON PO) Take  by mouth. digoxin (LANOXIN) 0.125 mg tablet Take 0.125 mg by mouth daily. Associated Diagnoses: Goiter; Hyperthyroidism      lisinopril (PRINIVIL, ZESTRIL) 5 mg tablet Take 5 mg by mouth daily. Associated Diagnoses: Goiter; Hyperthyroidism      amiodarone (CORDARONE) 200 mg tablet Take 200 mg by mouth daily. Associated Diagnoses: Goiter; Hyperthyroidism      ELIQUIS 5 mg tablet Take 10 mg by mouth daily. Associated Diagnoses: Goiter; Hyperthyroidism               Follow up Care:    1. Rosmery Martinez MD in 1-2 weeks. Please call to set up an appointment shortly after discharge. Diet:  cardiac    Disposition:  home    Advanced Directive:   FULL x   DNR      Discharge Exam:  Physical Exam  Vitals and nursing note reviewed. Constitutional:       General: She is not in acute distress.     Appearance: Normal appearance. She is well-developed. She is obese. She is not ill-appearing, toxic-appearing or diaphoretic. HENT:      Head: Normocephalic and atraumatic.      Nose: Nose normal. No congestion or rhinorrhea.      Mouth/Throat:      Mouth: Mucous membranes are moist.      Pharynx: Oropharynx is clear. No oropharyngeal exudate or posterior oropharyngeal erythema. Eyes:      General: No scleral icterus.     Conjunctiva/sclera: Conjunctivae normal.      Pupils: Pupils are equal, round, and reactive to light. Cardiovascular:      Rate and Rhythm: Normal rate. .      Pulses:           Radial pulses are 2+ on the right side and 2+ on the left side.        Dorsalis pedis pulses are 2+ on the right side and 2+ on the left side.      Heart sounds: No murmur heard. No friction rub.  No gallop.    Pulmonary:    Effort: Pulmonary effort is normal. No tachypnea, accessory muscle usage, respiratory distress or retractions.      Breath sounds: No stridor. Examination of the right-lower field reveals rales. Examination of the left-lower field reveals rales. Rales present. No decreased breath sounds, wheezing or rhonchi.      Comments: On 4L NC with SpO2 96%  Speaking in complete sentences  Chest:      Chest wall: No tenderness. Abdominal:      General: Bowel sounds are normal. There is no distension.      Palpations: Abdomen is soft. There is no mass.      Tenderness: There is no abdominal tenderness. There is no right CVA tenderness, left CVA tenderness, guarding or rebound. Musculoskeletal:         General: No deformity. Normal range of motion.      Cervical back: Normal range of motion and neck supple. No rigidity. No muscular tenderness.      Right lower leg:  Edema batter     Left lower leg:   Skin:     General: Skin is warm and dry.      Capillary Refill: Capillary refill takes less than 2 seconds.      Coloration: Skin is not jaundiced or pale.      Findings: No bruising, erythema or rash. Neurological:      General: No focal deficit present.      Mental Status: She is alert and oriented to person, place, and time. Mental status is at baseline.      Sensory: Sensation is intact.      Motor: Motor function is intact. Psychiatric: Regina Hire and Affect: Mood normal.         Behavior: Behavior normal. Behavior is cooperative.         Thought Content: Thought content normal.         Judgment: Judgment normal.     CONSULTATIONS: cardiology    Significant Diagnostic Studies:     Radiologic Studies -   Results from Hospital Encounter encounter on 07/02/21    XR CHEST PORT    Narrative  EXAM: XR CHEST PORT    INDICATION: sob    COMPARISON: October 25, 2019    FINDINGS: Enlarged cardiomediastinal contours again noted. Pulmonary congestion. Bilateral small pleural effusions with basal opacity. No pneumothorax.  No acute  fracture or dislocation. Impression  Bilateral small pleural effusions with basal opacity some of which  appears to be nodular, atelectasis, and/or airspace disease/edema.      CT Results  (Last 48 hours)    None              Discharge time spent 35 minutes    Signed:  Lyle Mayfield MD  7/3/2021  11:23 AM

## 2021-07-03 NOTE — ED NOTES
Called 4E to attempt to give report for pt to go to assigned admit Rm 425. Accepting admit nurse unable to take report at this time. Awaiting call back.

## 2021-07-03 NOTE — PROGRESS NOTES
Pt discharged home. Pt verbalized understanding of discharge instructions. Discharge plan of care/case management plan validated with provider discharge order.

## 2021-07-03 NOTE — PROGRESS NOTES
Per Dr. Tracy Prince patient can discharge when ride is available and does not need to wait for 2D echo.

## 2021-07-03 NOTE — ED NOTES
TRANSFER - OUT REPORT:    Verbal report given to accepting admit nurse, Caleb Melvin (name) on Jj Pink  being transferred to [facility] (unit) for routine progression of care       Report consisted of patients Situation, Background, Assessment and   Recommendations(SBAR). Information from the following report(s) SBAR was reviewed with the receiving nurse & care transferred. Lines:   Peripheral IV 07/02/21 Right Antecubital (Active)   Site Assessment Clean, dry, & intact 07/02/21 1658   Phlebitis Assessment 0 07/02/21 1658   Infiltration Assessment 0 07/02/21 1658   Dressing Status Clean, dry, & intact 07/02/21 1658   Dressing Type Tape;Transparent 07/02/21 1658   Hub Color/Line Status Green 07/02/21 1658        Opportunity for questions and clarification was provided. Patient transported to floor on stretcher by ED Tech with:   Monitor  O2 @ 4 liters    All belongings accompanied patient to floor in labeled belongings bag.

## 2021-07-03 NOTE — DISCHARGE INSTRUCTIONS
INSTRUCTIONS ON DISCHARGE    (1) please continue to take lasix 80mg daily     (2) F/u with your cardiologist and pulmonologist in 1 week.

## 2021-07-05 DIAGNOSIS — E05.90 HYPERTHYROIDISM: ICD-10-CM

## 2021-07-05 DIAGNOSIS — E05.80 AMIODARONE-INDUCED HYPERTHYROIDISM: ICD-10-CM

## 2021-07-05 DIAGNOSIS — T46.2X5A AMIODARONE-INDUCED HYPERTHYROIDISM: ICD-10-CM

## 2021-07-05 RX ORDER — METHIMAZOLE 10 MG/1
TABLET ORAL
Qty: 30 TABLET | Refills: 0 | Status: SHIPPED | OUTPATIENT
Start: 2021-07-05 | End: 2021-08-06

## 2021-08-05 DIAGNOSIS — E05.80 AMIODARONE-INDUCED HYPERTHYROIDISM: ICD-10-CM

## 2021-08-05 DIAGNOSIS — T46.2X5A AMIODARONE-INDUCED HYPERTHYROIDISM: ICD-10-CM

## 2021-08-05 DIAGNOSIS — E05.90 HYPERTHYROIDISM: ICD-10-CM

## 2021-08-06 RX ORDER — METHIMAZOLE 10 MG/1
TABLET ORAL
Qty: 30 TABLET | Refills: 0 | Status: SHIPPED | OUTPATIENT
Start: 2021-08-06 | End: 2022-09-04

## 2021-08-31 ENCOUNTER — DOCUMENTATION ONLY (OUTPATIENT)
Dept: ENDOCRINOLOGY | Age: 71
End: 2021-08-31

## 2021-08-31 NOTE — PROGRESS NOTES
Spoke to Yamil Flaherty -      Patient had seen her and another NP in their office and patient received both tapazole and levothyroxine for some time   Levo thyroxine was started when TSH was 125     Gladly TSH is down to 30 s now , but Ms. Dari Mosley wanted to check patient's follow up patter etc., with us  I did clarify that patient has not been seen since Feb 2020 and cancelled her recent appt etc.,     Ms. Dari Mosley said she will follow up on her for now and schedule pt with Dr. Tiesha Brannon in 4 months       Gianna Badillo MD

## 2022-03-18 PROBLEM — E05.90 HYPERTHYROIDISM: Status: ACTIVE | Noted: 2018-09-08

## 2022-03-18 PROBLEM — E04.9 GOITER: Status: ACTIVE | Noted: 2018-09-06

## 2022-03-19 PROBLEM — I50.9 CHF (CONGESTIVE HEART FAILURE) (HCC): Status: ACTIVE | Noted: 2021-07-02

## 2022-03-19 PROBLEM — T46.2X5A AMIODARONE-INDUCED HYPERTHYROIDISM: Status: ACTIVE | Noted: 2018-09-08

## 2022-03-19 PROBLEM — E05.80 AMIODARONE-INDUCED HYPERTHYROIDISM: Status: ACTIVE | Noted: 2018-09-08

## 2022-09-04 ENCOUNTER — HOSPITAL ENCOUNTER (INPATIENT)
Age: 72
LOS: 4 days | Discharge: HOME HEALTH | DRG: 291 | End: 2022-09-08
Attending: STUDENT IN AN ORGANIZED HEALTH CARE EDUCATION/TRAINING PROGRAM | Admitting: INTERNAL MEDICINE
Payer: MEDICARE

## 2022-09-04 ENCOUNTER — APPOINTMENT (OUTPATIENT)
Dept: GENERAL RADIOLOGY | Age: 72
DRG: 291 | End: 2022-09-04
Attending: EMERGENCY MEDICINE
Payer: MEDICARE

## 2022-09-04 DIAGNOSIS — J90 PLEURAL EFFUSION: ICD-10-CM

## 2022-09-04 DIAGNOSIS — I50.23 ACUTE ON CHRONIC SYSTOLIC CONGESTIVE HEART FAILURE (HCC): Primary | ICD-10-CM

## 2022-09-04 LAB
ALBUMIN SERPL-MCNC: 3.5 G/DL (ref 3.5–5)
ALBUMIN/GLOB SERPL: 1 {RATIO} (ref 1.1–2.2)
ALP SERPL-CCNC: 149 U/L (ref 45–117)
ALT SERPL-CCNC: 16 U/L (ref 12–78)
ANION GAP SERPL CALC-SCNC: 4 MMOL/L (ref 5–15)
AST SERPL W P-5'-P-CCNC: 17 U/L (ref 15–37)
ATRIAL RATE: 107 BPM
BASOPHILS # BLD: 0 K/UL (ref 0–0.1)
BASOPHILS NFR BLD: 0 % (ref 0–1)
BILIRUB SERPL-MCNC: 0.8 MG/DL (ref 0.2–1)
BNP SERPL-MCNC: ABNORMAL PG/ML
BUN SERPL-MCNC: 21 MG/DL (ref 6–20)
BUN/CREAT SERPL: 18 (ref 12–20)
CA-I BLD-MCNC: 8.8 MG/DL (ref 8.5–10.1)
CALCULATED R AXIS, ECG10: -109 DEGREES
CALCULATED T AXIS, ECG11: 53 DEGREES
CHLORIDE SERPL-SCNC: 98 MMOL/L (ref 97–108)
CO2 SERPL-SCNC: 38 MMOL/L (ref 21–32)
CREAT SERPL-MCNC: 1.16 MG/DL (ref 0.55–1.02)
DIAGNOSIS, 93000: NORMAL
DIFFERENTIAL METHOD BLD: ABNORMAL
EOSINOPHIL # BLD: 0 K/UL (ref 0–0.4)
EOSINOPHIL NFR BLD: 0 % (ref 0–7)
ERYTHROCYTE [DISTWIDTH] IN BLOOD BY AUTOMATED COUNT: 14.7 % (ref 11.5–14.5)
GLOBULIN SER CALC-MCNC: 3.5 G/DL (ref 2–4)
GLUCOSE SERPL-MCNC: 124 MG/DL (ref 65–100)
HCT VFR BLD AUTO: 39.4 % (ref 35–47)
HGB BLD-MCNC: 11.4 G/DL (ref 11.5–16)
IMM GRANULOCYTES # BLD AUTO: 0 K/UL (ref 0–0.04)
IMM GRANULOCYTES NFR BLD AUTO: 0 % (ref 0–0.5)
LYMPHOCYTES # BLD: 0.6 K/UL (ref 0.8–3.5)
LYMPHOCYTES NFR BLD: 8 % (ref 12–49)
MCH RBC QN AUTO: 25.7 PG (ref 26–34)
MCHC RBC AUTO-ENTMCNC: 28.9 G/DL (ref 30–36.5)
MCV RBC AUTO: 88.7 FL (ref 80–99)
MONOCYTES # BLD: 0.6 K/UL (ref 0–1)
MONOCYTES NFR BLD: 9 % (ref 5–13)
NEUTS SEG # BLD: 5.5 K/UL (ref 1.8–8)
NEUTS SEG NFR BLD: 83 % (ref 32–75)
NRBC # BLD: 0 K/UL (ref 0–0.01)
NRBC BLD-RTO: 0 PER 100 WBC
PLATELET # BLD AUTO: 180 K/UL (ref 150–400)
PMV BLD AUTO: 10.4 FL (ref 8.9–12.9)
POTASSIUM SERPL-SCNC: 4 MMOL/L (ref 3.5–5.1)
PROT SERPL-MCNC: 7 G/DL (ref 6.4–8.2)
Q-T INTERVAL, ECG07: 374 MS
QRS DURATION, ECG06: 138 MS
QTC CALCULATION (BEZET), ECG08: 489 MS
RBC # BLD AUTO: 4.44 M/UL (ref 3.8–5.2)
SODIUM SERPL-SCNC: 140 MMOL/L (ref 136–145)
TROPONIN-HIGH SENSITIVITY: 23 NG/L (ref 0–51)
VENTRICULAR RATE, ECG03: 103 BPM
WBC # BLD AUTO: 6.8 K/UL (ref 3.6–11)

## 2022-09-04 PROCEDURE — 74011000250 HC RX REV CODE- 250: Performed by: STUDENT IN AN ORGANIZED HEALTH CARE EDUCATION/TRAINING PROGRAM

## 2022-09-04 PROCEDURE — 65270000029 HC RM PRIVATE

## 2022-09-04 PROCEDURE — 96374 THER/PROPH/DIAG INJ IV PUSH: CPT

## 2022-09-04 PROCEDURE — 83880 ASSAY OF NATRIURETIC PEPTIDE: CPT

## 2022-09-04 PROCEDURE — 99285 EMERGENCY DEPT VISIT HI MDM: CPT

## 2022-09-04 PROCEDURE — 74011000250 HC RX REV CODE- 250: Performed by: INTERNAL MEDICINE

## 2022-09-04 PROCEDURE — 84484 ASSAY OF TROPONIN QUANT: CPT

## 2022-09-04 PROCEDURE — 74011250636 HC RX REV CODE- 250/636: Performed by: INTERNAL MEDICINE

## 2022-09-04 PROCEDURE — 93005 ELECTROCARDIOGRAM TRACING: CPT

## 2022-09-04 PROCEDURE — 94640 AIRWAY INHALATION TREATMENT: CPT

## 2022-09-04 PROCEDURE — 80053 COMPREHEN METABOLIC PANEL: CPT

## 2022-09-04 PROCEDURE — 85025 COMPLETE CBC W/AUTO DIFF WBC: CPT

## 2022-09-04 PROCEDURE — 74011250636 HC RX REV CODE- 250/636: Performed by: STUDENT IN AN ORGANIZED HEALTH CARE EDUCATION/TRAINING PROGRAM

## 2022-09-04 PROCEDURE — 36415 COLL VENOUS BLD VENIPUNCTURE: CPT

## 2022-09-04 PROCEDURE — 96375 TX/PRO/DX INJ NEW DRUG ADDON: CPT

## 2022-09-04 PROCEDURE — 71045 X-RAY EXAM CHEST 1 VIEW: CPT

## 2022-09-04 RX ORDER — IPRATROPIUM BROMIDE AND ALBUTEROL SULFATE 2.5; .5 MG/3ML; MG/3ML
3 SOLUTION RESPIRATORY (INHALATION)
Status: COMPLETED | OUTPATIENT
Start: 2022-09-04 | End: 2022-09-04

## 2022-09-04 RX ORDER — OMEPRAZOLE 20 MG/1
1 CAPSULE, DELAYED RELEASE ORAL DAILY
COMMUNITY
Start: 2022-06-06

## 2022-09-04 RX ORDER — ACETAMINOPHEN 325 MG/1
650 TABLET ORAL
Status: DISCONTINUED | OUTPATIENT
Start: 2022-09-04 | End: 2022-09-08 | Stop reason: HOSPADM

## 2022-09-04 RX ORDER — LEVOTHYROXINE SODIUM 25 UG/1
1 TABLET ORAL DAILY
COMMUNITY
Start: 2022-06-15

## 2022-09-04 RX ORDER — IPRATROPIUM BROMIDE AND ALBUTEROL SULFATE 2.5; .5 MG/3ML; MG/3ML
3 SOLUTION RESPIRATORY (INHALATION)
Status: DISCONTINUED | OUTPATIENT
Start: 2022-09-05 | End: 2022-09-08 | Stop reason: HOSPADM

## 2022-09-04 RX ORDER — FUROSEMIDE 10 MG/ML
20 INJECTION INTRAMUSCULAR; INTRAVENOUS
Status: COMPLETED | OUTPATIENT
Start: 2022-09-04 | End: 2022-09-04

## 2022-09-04 RX ORDER — ONDANSETRON 2 MG/ML
4 INJECTION INTRAMUSCULAR; INTRAVENOUS
Status: DISCONTINUED | OUTPATIENT
Start: 2022-09-04 | End: 2022-09-08 | Stop reason: HOSPADM

## 2022-09-04 RX ORDER — IPRATROPIUM BROMIDE AND ALBUTEROL SULFATE 2.5; .5 MG/3ML; MG/3ML
3 SOLUTION RESPIRATORY (INHALATION)
Status: DISCONTINUED | OUTPATIENT
Start: 2022-09-04 | End: 2022-09-04

## 2022-09-04 RX ORDER — FUROSEMIDE 10 MG/ML
40 INJECTION INTRAMUSCULAR; INTRAVENOUS EVERY 8 HOURS
Status: DISCONTINUED | OUTPATIENT
Start: 2022-09-04 | End: 2022-09-05

## 2022-09-04 RX ORDER — DOCUSATE SODIUM 100 MG/1
100 CAPSULE, LIQUID FILLED ORAL AS NEEDED
Status: DISCONTINUED | OUTPATIENT
Start: 2022-09-04 | End: 2022-09-08 | Stop reason: HOSPADM

## 2022-09-04 RX ADMIN — FUROSEMIDE 40 MG: 10 INJECTION, SOLUTION INTRAMUSCULAR; INTRAVENOUS at 20:06

## 2022-09-04 RX ADMIN — METHYLPREDNISOLONE SODIUM SUCCINATE 125 MG: 125 INJECTION, POWDER, FOR SOLUTION INTRAMUSCULAR; INTRAVENOUS at 14:24

## 2022-09-04 RX ADMIN — METHYLPREDNISOLONE SODIUM SUCCINATE 40 MG: 40 INJECTION, POWDER, FOR SOLUTION INTRAMUSCULAR; INTRAVENOUS at 17:56

## 2022-09-04 RX ADMIN — METHYLPREDNISOLONE SODIUM SUCCINATE 40 MG: 40 INJECTION, POWDER, FOR SOLUTION INTRAMUSCULAR; INTRAVENOUS at 20:06

## 2022-09-04 RX ADMIN — FUROSEMIDE 20 MG: 10 INJECTION, SOLUTION INTRAMUSCULAR; INTRAVENOUS at 14:24

## 2022-09-04 RX ADMIN — IPRATROPIUM BROMIDE AND ALBUTEROL SULFATE 3 ML: 2.5; .5 SOLUTION RESPIRATORY (INHALATION) at 23:31

## 2022-09-04 RX ADMIN — IPRATROPIUM BROMIDE AND ALBUTEROL SULFATE 3 ML: 2.5; .5 SOLUTION RESPIRATORY (INHALATION) at 14:24

## 2022-09-04 NOTE — PROGRESS NOTES
Reason for Admission:  SOB                     RUR Score:     8%                Plan for utilizing home health:  Not at this time        PCP: First and Last name:  Beto Barrera MD     Name of Practice:    Are you a current patient: Yes/No:    Approximate date of last visit: couple moths ago, has an appointment on Wednesday. Can you participate in a virtual visit with your PCP:                     Current Advanced Directive/Advance Care Plan: Prior      Healthcare Decision Maker:   Click here to complete [employer] including selection of the Healthcare Decision Maker Relationship (ie \"Primary\")           Debo Osborne, daughter, [phone]                  Transition of Care Plan:       Met f/f with Pt, she confirmed that the information on the face sheet it correct. Pt stated that she lives with her daughter, son-in-law and two grandchildren ages 6and 11years old. Pt stated no HH, has a cane and is independent with ADL. Pt stated that she uses [facility] in [place]. Pt stated that family will give her a ride home when she is D/C. CM dispo: home with no needs at this time.

## 2022-09-04 NOTE — H&P
History & Physical    Primary Care Provider: Ivan Serna MD  Source of Information: Patient/family     History of Presenting Illness:   Fely Fernandez is a 80 y.o. female with past medical history of asthma, COPD, hypertension, hyperthyroidism, heart failure, and afib who presented to the ED today with worsening short of breath that started 2 days ago. Patient reports that she normally has some shortness of breath due to COPD, but it's been getting worse over past couple days. She has also noticed wheezing and bilateral leg edema. Patient is on 2L of oxygen at home, had to increase to 3.5 L for the past 2 days. She is currently not on any inhalers at home. Does not follow with pulmonologist outpatient. She denies any fever, chills, chest pain, nausea, vomiting, or abdominal pain. In the ED her pro-BNP was elevated at 20,981. Patient reports smoking about 5 cigarettes every day. Used to smoke 1.5 ppd since 13years old. Denies alcohol or illicit drug use. She currently lives with her daughter, son, and grandchildren. Chest x ray shows moderate right pleural effusion and cardiomegaly. EKG shows atrial fibrillation. Review of Systems:  A comprehensive review of systems was negative except for that written in the History of Present Illness. 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 Authorizing Provider   levothyroxine (SYNTHROID) 25 mcg tablet Take 1 Tablet by mouth daily. 6/15/22   Provider, Historical   omeprazole (PRILOSEC) 20 mg capsule Take 1 Capsule by mouth daily.  6/6/22 Provider, Historical   furosemide (LASIX) 80 mg tablet Take 1 Tablet by mouth daily. 7/3/21   Uri Borden MD   atorvastatin (LIPITOR) 20 mg tablet TAKE 1 TABLET BY MOUTH EVERY DAY 12/21/19   Provider, Historical   ergocalciferol (ERGOCALCIFEROL) 50,000 unit capsule TAKE 1 CAPSULE BY MOUTH ONE TIME PER WEEK 12/21/19   Provider, Historical   ferrous sulfate (IRON PO) Take  by mouth. Provider, Historical   lisinopril (PRINIVIL, ZESTRIL) 5 mg tablet Take 5 mg by mouth daily. 7/21/18   Provider, Historical   amiodarone (CORDARONE) 200 mg tablet Take 200 mg by mouth daily. 8/31/18   Provider, Historical   ELIQUIS 5 mg tablet Take 10 mg by mouth daily. 8/31/18   Provider, Historical       No Known Allergies     Family History   Problem Relation Age of Onset    Heart Attack Mother     Lung Cancer Father         Social History     Socioeconomic History    Marital status:    Tobacco Use    Smoking status: Every Day     Packs/day: 0.50     Types: Cigarettes    Smokeless tobacco: Never   Substance and Sexual Activity    Alcohol use: No    Drug use: No            CODE STATUS:  DNR    Full    Other      Objective:     Physical Exam:     Visit Vitals  /64 (BP 1 Location: Right upper arm)   Pulse (!) 105   Temp 98.4 °F (36.9 °C)   Resp 20   Ht 5' 4\" (1.626 m)   Wt 79.4 kg (175 lb)   SpO2 94%   BMI 30.04 kg/m²    O2 Flow Rate (L/min): 2 l/min O2 Device: Nasal cannula    General:  Alert, cooperative, no distress, appears stated age. Head:  Normocephalic, without obvious abnormality, atraumatic. Eyes:  Conjunctivae/corneas clear. PERRL, EOMs intact. Nose: Nares normal. Septum midline. Mucosa normal. No drainage or sinus tenderness. Throat: Lips, mucosa, and tongue normal. Teeth and gums normal.   Neck: Supple, symmetrical, trachea midline, no adenopathy, thyroid: no enlargement/tenderness/nodules, no carotid bruit and no JVD. Back:   Symmetric, no curvature. ROM normal. No CVA tenderness.    Lungs: Wheezing and rhonchi present bilaterally. Crackles bilaterally as well   Chest wall:  No tenderness or deformity. Heart:  Regular rate and rhythm, S1, S2 normal, no murmur, click, rub or gallop. Abdomen:   Soft, non-tender. Bowel sounds normal. No masses,  No organomegaly. Extremities: Extremities normal, atraumatic, no cyanosis. Pitting edema present bilateral legs. Pulses: 2+ and symmetric all extremities. Skin: Skin color, texture, turgor normal. No rashes or lesions   Neurologic: CNII-XII intact. No motor or sensory deficits. 24 Hour Results:    Recent Results (from the past 24 hour(s))   CBC WITH AUTOMATED DIFF    Collection Time: 09/04/22 11:45 AM   Result Value Ref Range    WBC 6.8 3.6 - 11.0 K/uL    RBC 4.44 3.80 - 5.20 M/uL    HGB 11.4 (L) 11.5 - 16.0 g/dL    HCT 39.4 35.0 - 47.0 %    MCV 88.7 80.0 - 99.0 FL    MCH 25.7 (L) 26.0 - 34.0 PG    MCHC 28.9 (L) 30.0 - 36.5 g/dL    RDW 14.7 (H) 11.5 - 14.5 %    PLATELET 041 244 - 781 K/uL    MPV 10.4 8.9 - 12.9 FL    NRBC 0.0 0.0  WBC    ABSOLUTE NRBC 0.00 0.00 - 0.01 K/uL    NEUTROPHILS 83 (H) 32 - 75 %    LYMPHOCYTES 8 (L) 12 - 49 %    MONOCYTES 9 5 - 13 %    EOSINOPHILS 0 0 - 7 %    BASOPHILS 0 0 - 1 %    IMMATURE GRANULOCYTES 0 0 - 0.5 %    ABS. NEUTROPHILS 5.5 1.8 - 8.0 K/UL    ABS. LYMPHOCYTES 0.6 (L) 0.8 - 3.5 K/UL    ABS. MONOCYTES 0.6 0.0 - 1.0 K/UL    ABS. EOSINOPHILS 0.0 0.0 - 0.4 K/UL    ABS. BASOPHILS 0.0 0.0 - 0.1 K/UL    ABS. IMM.  GRANS. 0.0 0.00 - 0.04 K/UL    DF AUTOMATED     METABOLIC PANEL, COMPREHENSIVE    Collection Time: 09/04/22 11:45 AM   Result Value Ref Range    Sodium 140 136 - 145 mmol/L    Potassium 4.0 3.5 - 5.1 mmol/L    Chloride 98 97 - 108 mmol/L    CO2 38 (H) 21 - 32 mmol/L    Anion gap 4 (L) 5 - 15 mmol/L    Glucose 124 (H) 65 - 100 mg/dL    BUN 21 (H) 6 - 20 mg/dL    Creatinine 1.16 (H) 0.55 - 1.02 mg/dL    BUN/Creatinine ratio 18 12 - 20      GFR est AA 56 (L) >60 ml/min/1.73m2    GFR est non-AA 46 (L) >60 ml/min/1.73m2    Calcium 8.8 8.5 - 10.1 mg/dL    Bilirubin, total 0.8 0.2 - 1.0 mg/dL    AST (SGOT) 17 15 - 37 U/L    ALT (SGPT) 16 12 - 78 U/L    Alk.  phosphatase 149 (H) 45 - 117 U/L    Protein, total 7.0 6.4 - 8.2 g/dL    Albumin 3.5 3.5 - 5.0 g/dL    Globulin 3.5 2.0 - 4.0 g/dL    A-G Ratio 1.0 (L) 1.1 - 2.2     TROPONIN-HIGH SENSITIVITY    Collection Time: 09/04/22 11:45 AM   Result Value Ref Range    Troponin-High Sensitivity 23 0 - 51 ng/L   EKG, 12 LEAD, INITIAL    Collection Time: 09/04/22 11:51 AM   Result Value Ref Range    Ventricular Rate 103 BPM    Atrial Rate 107 BPM    QRS Duration 138 ms    Q-T Interval 374 ms    QTC Calculation (Bezet) 489 ms    Calculated R Axis -109 degrees    Calculated T Axis 53 degrees    Diagnosis       Atrial fibrillation with rapid ventricular response  Right superior axis deviation  Non-specific intra-ventricular conduction block  Abnormal ECG  When compared with ECG of 04-SEP-2022 11:50, (Unconfirmed)  No significant change was found  Confirmed by Mani Moran MD, Enriqueta Buitrago ([id number]) on 9/4/2022 2:02:40 PM     NT-PRO BNP    Collection Time: 09/04/22 12:45 PM   Result Value Ref Range    NT pro-BNP 20,981 (H) <125 pg/mL         Imaging:   XR CHEST PORT   Final Result      Moderate right pleural effusion   Cardiomegaly              Assessment:   Acute on chronic heart failure, unspecified    Acute on chronic respiratory failure with hypoxia and hypercapnia    COPD exacerbation    Atrial fibrillation    Right pleural effusion, likely due to CHF    Hypothyroidism, following treatment for hyperthyroidism    Hypertension    Tobacco abuse    Plan:   Admit to medical floor as inpatient  Start IV furosemide  Start Solu-medrol  Duo-neb as needed  Cardiology and pulmonology consult  Echocardiogram  Continue home meds    CODE STATUS is full per patient request    Home medications were reviewed    Signed By: Flaca Snider MD     September 4, 2022

## 2022-09-04 NOTE — MED STUDENT NOTES
History & Physical    Primary Care Provider: Mariann Neves MD  Source of Information: Patient/family     History of Presenting Illness:   Cindy Arriaza is a 80 y.o. female with past medical history of asthma, COPD, hypertension, hyperthyroidism, heart failure, and afib who presented to the ED today with worsening short of breath that started 2 days ago. Patient reports that she normally has some shortness of breath due to COPD, but it's been getting worse over past couple days. She has also noticed wheezing and bilateral leg edema. Patient is on 2L of oxygen at home, had to increase to 3.5 L for the past 2 days. She is currently not on any inhalers at home. Does not follow with pulmonologist outpatient. She denies any fever, chills, chest pain, nausea, vomiting, or abdominal pain. In the ED her pro-BNP was elevated at 20,981. Patient reports smoking about 5 cigarettes every day. Used to smoke 1.5 ppd since 13years old. Denies alcohol or illicit drug use. She currently lives with her daughter, son, and grandchildren. Chest x ray shows moderate right pleural effusion and cardiomegaly. EKG shows atrial fibrillation. Review of Systems:  A comprehensive review of systems was negative except for that written in the History of Present Illness. 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 Authorizing Provider   levothyroxine (SYNTHROID) 25 mcg tablet Take 1 Tablet by mouth daily. 6/15/22   Provider, Historical   omeprazole (PRILOSEC) 20 mg capsule Take 1 Capsule by mouth daily.  6/6/22 Provider, Historical   methIMAzole (TAPAZOLE) 10 mg tablet TAKE ONE TABLET BY MOUTH EVERY DAY 8/6/21   David Smith MD   furosemide (LASIX) 80 mg tablet Take 1 Tablet by mouth daily. 7/3/21   Alice Keen MD   atorvastatin (LIPITOR) 20 mg tablet TAKE 1 TABLET BY MOUTH EVERY DAY 12/21/19   Provider, Historical   ergocalciferol (ERGOCALCIFEROL) 50,000 unit capsule TAKE 1 CAPSULE BY MOUTH ONE TIME PER WEEK 12/21/19   Provider, Historical   ferrous sulfate (IRON PO) Take  by mouth. Provider, Historical   lisinopril (PRINIVIL, ZESTRIL) 5 mg tablet Take 5 mg by mouth daily. 7/21/18   Provider, Historical   amiodarone (CORDARONE) 200 mg tablet Take 200 mg by mouth daily. 8/31/18   Provider, Historical   ELIQUIS 5 mg tablet Take 10 mg by mouth daily. 8/31/18   Provider, Historical       No Known Allergies     Family History   Problem Relation Age of Onset    Heart Attack Mother     Lung Cancer Father         Social History     Socioeconomic History    Marital status:    Tobacco Use    Smoking status: Every Day     Packs/day: 0.50     Types: Cigarettes    Smokeless tobacco: Never   Substance and Sexual Activity    Alcohol use: No    Drug use: No            CODE STATUS:  DNR    Full    Other      Objective:     Physical Exam:     Visit Vitals  BP (!) 121/92   Pulse (!) 118   Temp 98.3 °F (36.8 °C)   Resp 16   Ht 5' 4\" (1.626 m)   Wt 79.4 kg (175 lb)   SpO2 96%   BMI 30.04 kg/m²    O2 Flow Rate (L/min): 2 l/min O2 Device: Nasal cannula    General:  Alert, cooperative, no distress, appears stated age. Head:  Normocephalic, without obvious abnormality, atraumatic. Eyes:  Conjunctivae/corneas clear. PERRL, EOMs intact. Nose: Nares normal. Septum midline. Mucosa normal. No drainage or sinus tenderness. Throat: Lips, mucosa, and tongue normal. Teeth and gums normal.   Neck: Supple, symmetrical, trachea midline, no adenopathy, thyroid: no enlargement/tenderness/nodules, no carotid bruit and no JVD.    Back: Symmetric, no curvature. ROM normal. No CVA tenderness. Lungs:   Wheezing and rhonchi present bilaterally. Chest wall:  No tenderness or deformity. Heart:  Regular rate and rhythm, S1, S2 normal, no murmur, click, rub or gallop. Abdomen:   Soft, non-tender. Bowel sounds normal. No masses,  No organomegaly. Extremities: Extremities normal, atraumatic, no cyanosis. Pitting edema present bilateral legs. Pulses: 2+ and symmetric all extremities. Skin: Skin color, texture, turgor normal. No rashes or lesions   Neurologic: CNII-XII intact. No motor or sensory deficits. 24 Hour Results:    Recent Results (from the past 24 hour(s))   CBC WITH AUTOMATED DIFF    Collection Time: 09/04/22 11:45 AM   Result Value Ref Range    WBC 6.8 3.6 - 11.0 K/uL    RBC 4.44 3.80 - 5.20 M/uL    HGB 11.4 (L) 11.5 - 16.0 g/dL    HCT 39.4 35.0 - 47.0 %    MCV 88.7 80.0 - 99.0 FL    MCH 25.7 (L) 26.0 - 34.0 PG    MCHC 28.9 (L) 30.0 - 36.5 g/dL    RDW 14.7 (H) 11.5 - 14.5 %    PLATELET 855 888 - 464 K/uL    MPV 10.4 8.9 - 12.9 FL    NRBC 0.0 0.0  WBC    ABSOLUTE NRBC 0.00 0.00 - 0.01 K/uL    NEUTROPHILS 83 (H) 32 - 75 %    LYMPHOCYTES 8 (L) 12 - 49 %    MONOCYTES 9 5 - 13 %    EOSINOPHILS 0 0 - 7 %    BASOPHILS 0 0 - 1 %    IMMATURE GRANULOCYTES 0 0 - 0.5 %    ABS. NEUTROPHILS 5.5 1.8 - 8.0 K/UL    ABS. LYMPHOCYTES 0.6 (L) 0.8 - 3.5 K/UL    ABS. MONOCYTES 0.6 0.0 - 1.0 K/UL    ABS. EOSINOPHILS 0.0 0.0 - 0.4 K/UL    ABS. BASOPHILS 0.0 0.0 - 0.1 K/UL    ABS. IMM.  GRANS. 0.0 0.00 - 0.04 K/UL    DF AUTOMATED     METABOLIC PANEL, COMPREHENSIVE    Collection Time: 09/04/22 11:45 AM   Result Value Ref Range    Sodium 140 136 - 145 mmol/L    Potassium 4.0 3.5 - 5.1 mmol/L    Chloride 98 97 - 108 mmol/L    CO2 38 (H) 21 - 32 mmol/L    Anion gap 4 (L) 5 - 15 mmol/L    Glucose 124 (H) 65 - 100 mg/dL    BUN 21 (H) 6 - 20 mg/dL    Creatinine 1.16 (H) 0.55 - 1.02 mg/dL    BUN/Creatinine ratio 18 12 - 20      GFR est AA 56 (L) >60 ml/min/1.73m2    GFR est non-AA 46 (L) >60 ml/min/1.73m2    Calcium 8.8 8.5 - 10.1 mg/dL    Bilirubin, total 0.8 0.2 - 1.0 mg/dL    AST (SGOT) 17 15 - 37 U/L    ALT (SGPT) 16 12 - 78 U/L    Alk.  phosphatase 149 (H) 45 - 117 U/L    Protein, total 7.0 6.4 - 8.2 g/dL    Albumin 3.5 3.5 - 5.0 g/dL    Globulin 3.5 2.0 - 4.0 g/dL    A-G Ratio 1.0 (L) 1.1 - 2.2     TROPONIN-HIGH SENSITIVITY    Collection Time: 09/04/22 11:45 AM   Result Value Ref Range    Troponin-High Sensitivity 23 0 - 51 ng/L   EKG, 12 LEAD, INITIAL    Collection Time: 09/04/22 11:51 AM   Result Value Ref Range    Ventricular Rate 103 BPM    Atrial Rate 107 BPM    QRS Duration 138 ms    Q-T Interval 374 ms    QTC Calculation (Bezet) 489 ms    Calculated R Axis -109 degrees    Calculated T Axis 53 degrees    Diagnosis       Atrial fibrillation with rapid ventricular response  Right superior axis deviation  Non-specific intra-ventricular conduction block  Abnormal ECG  When compared with ECG of 04-SEP-2022 11:50, (Unconfirmed)  No significant change was found  Confirmed by Isabel Caldwell MD, John Bardales ([id number]) on 9/4/2022 2:02:40 PM     NT-PRO BNP    Collection Time: 09/04/22 12:45 PM   Result Value Ref Range    NT pro-BNP 20,981 (H) <125 pg/mL         Imaging:   XR CHEST PORT   Final Result      Moderate right pleural effusion   Cardiomegaly              Assessment:   Acute on chronic heart failure, unspecified    Acute on chronic respiratory failure with hypoxia and hypercapnia    COPD exacerbation    Atrial fibrillation    Right pleural effusion, likely due to CHF    Hypothyroidism, following treatment for hyperthyroidism    Hypertension    Tobacco abuse    Plan:   Admit to medical floor as inpatient  Start IV furosemide  Start Solu-medrol  Duo-neb as needed  Cardiology consult  Echocardiogram  Continue home meds    Home medications were reviewed    Signed By: Annemarie Goldstein     September 4, 2022         *ATTENTION:  This note has been created by a medical student for educational purposes only. Please do not refer to the content of this note for clinical decision-making, billing, or other purposes. Please see attending physicians note to obtain clinical information on this patient. *

## 2022-09-04 NOTE — ED PROVIDER NOTES
EMERGENCY DEPARTMENT HISTORY AND PHYSICAL EXAM      Date: 9/4/2022  Patient Name: Candie Carson    History of Presenting Illness     Chief Complaint   Patient presents with    Shortness of Breath       History Provided By: Patient    HPI: Candie Carson, 80 y.o. female with a past medical history significant COPD, chf, presents to the ED with cc of shortness of breath. Patient states over the last 2 days she has noted worsening shortness of breath. Additionally patient complaining of lower extremity swelling. Denies any fevers chills, is coughing, dry cough. Denies any chest pains, denies any abdominal pains, nausea or vomiting. Patient has been taking her albuterol nebulizer with minimal relief, recently ran out. There are no other complaints, changes, or physical findings at this time.     PCP: Ehsan Mendoza MD    Current Facility-Administered Medications   Medication Dose Route Frequency Provider Last Rate Last Admin    albuterol-ipratropium (DUO-NEB) 2.5 MG-0.5 MG/3 ML  3 mL Nebulization Q4H PRN Carmen Feliciano MD        methylPREDNISolone (PF) (SOLU-MEDROL) injection 40 mg  40 mg IntraVENous Q8H Melba De La Rosa MD   40 mg at 09/04/22 2006    ondansetron (ZOFRAN) injection 4 mg  4 mg IntraVENous Q6H PRN Melba De La Rosa MD        acetaminophen (TYLENOL) tablet 650 mg  650 mg Oral Q6H PRN Melba De La Rosa MD        furosemide (LASIX) injection 40 mg  40 mg IntraVENous Q8H Melba De La Rosa MD   40 mg at 09/04/22 2006    docusate sodium (COLACE) capsule 100 mg  100 mg Oral PRN Melba De La Rosa MD           Past History     Past Medical History:  Past Medical History:   Diagnosis Date    Atrial fibrillation ([address]     Cardiomyopathy ([address]     Chronic systolic heart failure (HCC)     COPD (chronic obstructive pulmonary disease) ([address]     Goiter     Histoplasmosis     Hypertension     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        Past Surgical History:  Past Surgical History:   Procedure Laterality Date HX CHOLECYSTECTOMY      HX HYSTERECTOMY  1994       Family History:  Family History   Problem Relation Age of Onset    Heart Attack Mother     Lung Cancer Father        Social History:  Social History     Tobacco Use    Smoking status: Every Day     Packs/day: 0.50     Types: Cigarettes    Smokeless tobacco: Never   Substance Use Topics    Alcohol use: No    Drug use: No       Allergies:  No Known Allergies      Review of Systems     Review of Systems   Constitutional:  Negative for activity change, appetite change, chills and fever. HENT:  Negative for ear pain, rhinorrhea and sore throat. Eyes:  Negative for photophobia and visual disturbance. Respiratory:  Positive for cough and shortness of breath. Cardiovascular:  Positive for leg swelling. Negative for chest pain and palpitations. Gastrointestinal:  Negative for abdominal pain and nausea. Genitourinary:  Negative for dysuria and hematuria. Musculoskeletal:  Negative for arthralgias and myalgias. Skin:  Negative for color change and pallor. Neurological:  Negative for dizziness, weakness, numbness and headaches. Physical Exam     Physical Exam  Vitals and nursing note reviewed. Constitutional:       General: She is not in acute distress. Appearance: Normal appearance. She is normal weight. She is not ill-appearing. HENT:      Head: Normocephalic and atraumatic. Nose: Nose normal.      Mouth/Throat:      Mouth: Mucous membranes are moist.   Eyes:      Extraocular Movements: Extraocular movements intact. Pupils: Pupils are equal, round, and reactive to light. Cardiovascular:      Rate and Rhythm: Normal rate and regular rhythm. Pulses: Normal pulses. Pulmonary:      Effort: Pulmonary effort is normal.      Breath sounds: Examination of the right-lower field reveals decreased breath sounds. Decreased breath sounds present. Abdominal:      General: Abdomen is flat.  Bowel sounds are normal.      Palpations: Abdomen is soft. Tenderness: There is no abdominal tenderness. There is no guarding. Musculoskeletal:         General: No tenderness. Normal range of motion. Cervical back: Normal range of motion and neck supple. No muscular tenderness. Right lower leg: Edema present. Left lower leg: Edema present. Skin:     General: Skin is warm and dry. Neurological:      General: No focal deficit present. Mental Status: She is alert and oriented to person, place, and time. Sensory: No sensory deficit. Motor: No weakness. Diagnostic Study Results     Labs -     Recent Results (from the past 12 hour(s))   CBC WITH AUTOMATED DIFF    Collection Time: 09/04/22 11:45 AM   Result Value Ref Range    WBC 6.8 3.6 - 11.0 K/uL    RBC 4.44 3.80 - 5.20 M/uL    HGB 11.4 (L) 11.5 - 16.0 g/dL    HCT 39.4 35.0 - 47.0 %    MCV 88.7 80.0 - 99.0 FL    MCH 25.7 (L) 26.0 - 34.0 PG    MCHC 28.9 (L) 30.0 - 36.5 g/dL    RDW 14.7 (H) 11.5 - 14.5 %    PLATELET 428 811 - 348 K/uL    MPV 10.4 8.9 - 12.9 FL    NRBC 0.0 0.0  WBC    ABSOLUTE NRBC 0.00 0.00 - 0.01 K/uL    NEUTROPHILS 83 (H) 32 - 75 %    LYMPHOCYTES 8 (L) 12 - 49 %    MONOCYTES 9 5 - 13 %    EOSINOPHILS 0 0 - 7 %    BASOPHILS 0 0 - 1 %    IMMATURE GRANULOCYTES 0 0 - 0.5 %    ABS. NEUTROPHILS 5.5 1.8 - 8.0 K/UL    ABS. LYMPHOCYTES 0.6 (L) 0.8 - 3.5 K/UL    ABS. MONOCYTES 0.6 0.0 - 1.0 K/UL    ABS. EOSINOPHILS 0.0 0.0 - 0.4 K/UL    ABS. BASOPHILS 0.0 0.0 - 0.1 K/UL    ABS. IMM.  GRANS. 0.0 0.00 - 0.04 K/UL    DF AUTOMATED     METABOLIC PANEL, COMPREHENSIVE    Collection Time: 09/04/22 11:45 AM   Result Value Ref Range    Sodium 140 136 - 145 mmol/L    Potassium 4.0 3.5 - 5.1 mmol/L    Chloride 98 97 - 108 mmol/L    CO2 38 (H) 21 - 32 mmol/L    Anion gap 4 (L) 5 - 15 mmol/L    Glucose 124 (H) 65 - 100 mg/dL    BUN 21 (H) 6 - 20 mg/dL    Creatinine 1.16 (H) 0.55 - 1.02 mg/dL    BUN/Creatinine ratio 18 12 - 20      GFR est AA 56 (L) >60 ml/min/1.73m2 GFR est non-AA 46 (L) >60 ml/min/1.73m2    Calcium 8.8 8.5 - 10.1 mg/dL    Bilirubin, total 0.8 0.2 - 1.0 mg/dL    AST (SGOT) 17 15 - 37 U/L    ALT (SGPT) 16 12 - 78 U/L    Alk. phosphatase 149 (H) 45 - 117 U/L    Protein, total 7.0 6.4 - 8.2 g/dL    Albumin 3.5 3.5 - 5.0 g/dL    Globulin 3.5 2.0 - 4.0 g/dL    A-G Ratio 1.0 (L) 1.1 - 2.2     TROPONIN-HIGH SENSITIVITY    Collection Time: 09/04/22 11:45 AM   Result Value Ref Range    Troponin-High Sensitivity 23 0 - 51 ng/L   EKG, 12 LEAD, INITIAL    Collection Time: 09/04/22 11:51 AM   Result Value Ref Range    Ventricular Rate 103 BPM    Atrial Rate 107 BPM    QRS Duration 138 ms    Q-T Interval 374 ms    QTC Calculation (Bezet) 489 ms    Calculated R Axis -109 degrees    Calculated T Axis 53 degrees    Diagnosis       Atrial fibrillation with rapid ventricular response  Right superior axis deviation  Non-specific intra-ventricular conduction block  Abnormal ECG  When compared with ECG of 04-SEP-2022 11:50, (Unconfirmed)  No significant change was found  Confirmed by Ayad Ann MD ([id number]) on 9/4/2022 2:02:40 PM     NT-PRO BNP    Collection Time: 09/04/22 12:45 PM   Result Value Ref Range    NT pro-BNP 20,981 (H) <125 pg/mL       Radiologic Studies -   [unfilled]  CT Results  (Last 48 hours)      None          CXR Results  (Last 48 hours)                 09/04/22 1203  XR CHEST PORT Final result    Impression:      Moderate right pleural effusion   Cardiomegaly       Narrative:  EXAM: XR CHEST PORT       INDICATION: Shortness of breath       COMPARISON: 7/2/2021       FINDINGS: A portable AP radiograph of the chest was obtained at 1157 hours. There is a moderate right pleural effusion. The cardiac and mediastinal contours   and pulmonary vascularity are remarkable for cardiomegaly. The bones and soft   tissues are grossly within normal limits. Medical Decision Making and ED Course   I am the first provider for this patient.     I reviewed the vital signs, available nursing notes, past medical history, past surgical history, family history and social history. Vital Signs-Reviewed the patient's vital signs. Patient Vitals for the past 12 hrs:   Temp Pulse Resp BP SpO2   09/04/22 1958 97.5 °F (36.4 °C) (!) 101 20 134/79 93 %   09/04/22 1721 98.3 °F (36.8 °C) 93 20 122/70 94 %   09/04/22 1620 98.4 °F (36.9 °C) (!) 105 20 119/64 94 %   09/04/22 1600 -- 93 -- -- --   09/04/22 1142 98.3 °F (36.8 °C) (!) 118 16 (!) 121/92 96 %       EKG interpretation: (Preliminary)  A. fib 103, no ST elevation, right axis, nonspecific interventricular block    Records Reviewed: Nursing Notes    The patient presents with shortness of breath with a differential diagnosis of COPD exacerbation, CHF exacerbation, fluid overload, pneumonia, ACS      Provider Notes (Medical Decision Making):     MDM     67-year-old female history of COPD, CHF, presents emergency department complaining of shortness of breath. Patient noted to be mildly tachycardic, A. fib, decreased breath sounds bilateral, bilateral lower extremity swelling. Will treat for possible COPD, and CHF exacerbation, administer albuterol, ipratropium neb, Solu-Medrol, Lasix 20 mg IV. ED Course:   Initial assessment performed. The patients presenting problems have been discussed, and they are in agreement with the care plan formulated and outlined with them. I have encouraged them to ask questions as they arise throughout their visit. ED Course as of 09/04/22 2233   Mariola Petite Sep 04, 2022   1334 Patient's chest x-ray resulted, moderate right-sided pleural effusion noted. On old x-ray patient appears to have a small right-sided pleural effusion, patient states she has not been told for the pleural effusion. [PZ]   1335 Afebrile, no white count, do not feel that patient requires antibiotics for pleural effusion at this time.   Will discuss with hospitalist. [PZ]   [phone] Discussed results with patient, patient states she feels improved clinically, is able to ambulate to bathroom however on ambulation patient's desaturated to 85-86%. Agrees to stay and hospital for diuresis and possible pleurocentesis. [PZ]   0857 Discussed case with Dr. Nadine Shrestha, accepts patient for admission, requesting ABG to assess CO2. [PZ]      ED Course User Index  [PZ] Teresa Fernandes MD         Procedures       Dion Wayne MD  Procedures           Disposition       Admitted      Diagnosis     Clinical Impression:   1. Acute on chronic systolic congestive heart failure ([address]    2. Pleural effusion        Attestations:    Dion Wayne MD    Please note that this dictation was completed with [employer], the computer voice recognition software. Quite often unanticipated grammatical, syntax, homophones, and other interpretive errors are inadvertently transcribed by the computer software. Please disregard these errors. Please excuse any errors that have escaped final proofreading. Thank you.

## 2022-09-04 NOTE — ED TRIAGE NOTES
Pt arrives from home, pt reports shortness of breath that started 2 days ago and has been getting progressively worse. Pt has hx COPD, a fib.

## 2022-09-05 LAB
ANION GAP SERPL CALC-SCNC: 2 MMOL/L (ref 5–15)
BUN SERPL-MCNC: 28 MG/DL (ref 6–20)
BUN/CREAT SERPL: 23 (ref 12–20)
CA-I BLD-MCNC: 9 MG/DL (ref 8.5–10.1)
CHLORIDE SERPL-SCNC: 98 MMOL/L (ref 97–108)
CO2 SERPL-SCNC: 39 MMOL/L (ref 21–32)
CREAT SERPL-MCNC: 1.2 MG/DL (ref 0.55–1.02)
GLUCOSE SERPL-MCNC: 169 MG/DL (ref 65–100)
POTASSIUM SERPL-SCNC: 3.8 MMOL/L (ref 3.5–5.1)
SODIUM SERPL-SCNC: 139 MMOL/L (ref 136–145)
TSH SERPL DL<=0.05 MIU/L-ACNC: <0.01 UIU/ML (ref 0.36–3.74)

## 2022-09-05 PROCEDURE — 84439 ASSAY OF FREE THYROXINE: CPT

## 2022-09-05 PROCEDURE — 80048 BASIC METABOLIC PNL TOTAL CA: CPT

## 2022-09-05 PROCEDURE — 84481 FREE ASSAY (FT-3): CPT

## 2022-09-05 PROCEDURE — 77010033678 HC OXYGEN DAILY

## 2022-09-05 PROCEDURE — 74011250636 HC RX REV CODE- 250/636: Performed by: PHYSICIAN ASSISTANT

## 2022-09-05 PROCEDURE — 74011250636 HC RX REV CODE- 250/636: Performed by: INTERNAL MEDICINE

## 2022-09-05 PROCEDURE — 94640 AIRWAY INHALATION TREATMENT: CPT

## 2022-09-05 PROCEDURE — 74011250637 HC RX REV CODE- 250/637: Performed by: INTERNAL MEDICINE

## 2022-09-05 PROCEDURE — 84480 ASSAY TRIIODOTHYRONINE (T3): CPT

## 2022-09-05 PROCEDURE — 74011000250 HC RX REV CODE- 250: Performed by: INTERNAL MEDICINE

## 2022-09-05 PROCEDURE — 36415 COLL VENOUS BLD VENIPUNCTURE: CPT

## 2022-09-05 PROCEDURE — 84443 ASSAY THYROID STIM HORMONE: CPT

## 2022-09-05 PROCEDURE — 94761 N-INVAS EAR/PLS OXIMETRY MLT: CPT

## 2022-09-05 PROCEDURE — 65270000029 HC RM PRIVATE

## 2022-09-05 RX ORDER — AMIODARONE HYDROCHLORIDE 200 MG/1
200 TABLET ORAL DAILY
Status: DISCONTINUED | OUTPATIENT
Start: 2022-09-05 | End: 2022-09-07

## 2022-09-05 RX ORDER — ENOXAPARIN SODIUM 100 MG/ML
1 INJECTION SUBCUTANEOUS EVERY 12 HOURS
Status: DISCONTINUED | OUTPATIENT
Start: 2022-09-05 | End: 2022-09-06

## 2022-09-05 RX ORDER — LISINOPRIL 5 MG/1
5 TABLET ORAL DAILY
Status: DISCONTINUED | OUTPATIENT
Start: 2022-09-05 | End: 2022-09-05

## 2022-09-05 RX ORDER — FUROSEMIDE 40 MG/1
80 TABLET ORAL DAILY
Status: DISCONTINUED | OUTPATIENT
Start: 2022-09-05 | End: 2022-09-08 | Stop reason: HOSPADM

## 2022-09-05 RX ORDER — LEVOTHYROXINE SODIUM 25 UG/1
25 TABLET ORAL DAILY
Status: DISCONTINUED | OUTPATIENT
Start: 2022-09-05 | End: 2022-09-08 | Stop reason: HOSPADM

## 2022-09-05 RX ORDER — PANTOPRAZOLE SODIUM 40 MG/1
40 TABLET, DELAYED RELEASE ORAL DAILY
Status: DISCONTINUED | OUTPATIENT
Start: 2022-09-05 | End: 2022-09-08 | Stop reason: HOSPADM

## 2022-09-05 RX ORDER — ATORVASTATIN CALCIUM 20 MG/1
20 TABLET, FILM COATED ORAL DAILY
Status: DISCONTINUED | OUTPATIENT
Start: 2022-09-05 | End: 2022-09-08 | Stop reason: HOSPADM

## 2022-09-05 RX ORDER — FUROSEMIDE 10 MG/ML
40 INJECTION INTRAMUSCULAR; INTRAVENOUS 2 TIMES DAILY
Status: DISCONTINUED | OUTPATIENT
Start: 2022-09-05 | End: 2022-09-07

## 2022-09-05 RX ADMIN — AMIODARONE HYDROCHLORIDE 200 MG: 200 TABLET ORAL at 08:54

## 2022-09-05 RX ADMIN — METHYLPREDNISOLONE SODIUM SUCCINATE 40 MG: 40 INJECTION, POWDER, FOR SOLUTION INTRAMUSCULAR; INTRAVENOUS at 20:46

## 2022-09-05 RX ADMIN — ENOXAPARIN SODIUM 90 MG: 100 INJECTION SUBCUTANEOUS at 20:46

## 2022-09-05 RX ADMIN — FUROSEMIDE 40 MG: 10 INJECTION, SOLUTION INTRAMUSCULAR; INTRAVENOUS at 20:46

## 2022-09-05 RX ADMIN — LISINOPRIL 5 MG: 5 TABLET ORAL at 08:54

## 2022-09-05 RX ADMIN — APIXABAN 5 MG: 5 TABLET, FILM COATED ORAL at 09:17

## 2022-09-05 RX ADMIN — METHYLPREDNISOLONE SODIUM SUCCINATE 40 MG: 40 INJECTION, POWDER, FOR SOLUTION INTRAMUSCULAR; INTRAVENOUS at 14:31

## 2022-09-05 RX ADMIN — FUROSEMIDE 40 MG: 10 INJECTION, SOLUTION INTRAMUSCULAR; INTRAVENOUS at 05:02

## 2022-09-05 RX ADMIN — PANTOPRAZOLE SODIUM 40 MG: 40 TABLET, DELAYED RELEASE ORAL at 08:53

## 2022-09-05 RX ADMIN — ATORVASTATIN CALCIUM 20 MG: 20 TABLET, FILM COATED ORAL at 08:54

## 2022-09-05 RX ADMIN — LEVOTHYROXINE SODIUM 25 MCG: 0.03 TABLET ORAL at 08:54

## 2022-09-05 RX ADMIN — IPRATROPIUM BROMIDE AND ALBUTEROL SULFATE 3 ML: 2.5; .5 SOLUTION RESPIRATORY (INHALATION) at 08:34

## 2022-09-05 RX ADMIN — IPRATROPIUM BROMIDE AND ALBUTEROL SULFATE 3 ML: 2.5; .5 SOLUTION RESPIRATORY (INHALATION) at 20:30

## 2022-09-05 RX ADMIN — METHYLPREDNISOLONE SODIUM SUCCINATE 40 MG: 40 INJECTION, POWDER, FOR SOLUTION INTRAMUSCULAR; INTRAVENOUS at 05:02

## 2022-09-05 NOTE — PROGRESS NOTES
Hospitalist Progress Note    Subjective:   Daily Progress Note: 2022 8:49 AM    Patient reports her shortness of breath has improved after her breathing treatment this morning. She is currently on 2L nasal cannula, bilateral lower extremity swelling has improved. Current Facility-Administered Medications   Medication Dose Route Frequency    lisinopriL (PRINIVIL, ZESTRIL) tablet 5 mg  5 mg Oral DAILY    amiodarone (CORDARONE) tablet 200 mg  200 mg Oral DAILY    apixaban (ELIQUIS) tablet 10 mg  10 mg Oral DAILY    atorvastatin (LIPITOR) tablet 20 mg  20 mg Oral DAILY    furosemide (LASIX) tablet 80 mg  80 mg Oral DAILY    levothyroxine (SYNTHROID) tablet 25 mcg  25 mcg Oral DAILY    pantoprazole (PROTONIX) tablet 40 mg  40 mg Oral DAILY    methylPREDNISolone (PF) (SOLU-MEDROL) injection 40 mg  40 mg IntraVENous Q8H    ondansetron (ZOFRAN) injection 4 mg  4 mg IntraVENous Q6H PRN    acetaminophen (TYLENOL) tablet 650 mg  650 mg Oral Q6H PRN    furosemide (LASIX) injection 40 mg  40 mg IntraVENous Q8H    docusate sodium (COLACE) capsule 100 mg  100 mg Oral PRN    albuterol-ipratropium (DUO-NEB) 2.5 MG-0.5 MG/3 ML  3 mL Nebulization BID RT        Review of Systems  Review of Systems   Constitutional:  Negative for chills and fever. HENT: Negative. Eyes: Negative. Respiratory:  Positive for cough and shortness of breath. Cardiovascular:  Positive for leg swelling. Negative for chest pain. Gastrointestinal: Negative. Genitourinary: Negative. Musculoskeletal: Negative. Neurological: Negative.            Objective:     Visit Vitals  /76 (BP Patient Position: At rest;Semi fowlers)   Pulse 92   Temp 97.8 °F (36.6 °C)   Resp 20   Ht 5' 4\" (1.626 m)   Wt 192 lb 7.4 oz (87.3 kg)   SpO2 94%   Breastfeeding No   BMI 33.04 kg/m²    O2 Flow Rate (L/min): 2 l/min O2 Device: Nasal cannula    Temp (24hrs), Av.9 °F (36.6 °C), Min:97.5 °F (36.4 °C), Max:98.4 °F (36.9 °C)      No intake/output data recorded. 09/03 1901 - 09/05 0700  In: -   Out: 500 [Urine:500]    Recent Results (from the past 24 hour(s))   CBC WITH AUTOMATED DIFF    Collection Time: 09/04/22 11:45 AM   Result Value Ref Range    WBC 6.8 3.6 - 11.0 K/uL    RBC 4.44 3.80 - 5.20 M/uL    HGB 11.4 (L) 11.5 - 16.0 g/dL    HCT 39.4 35.0 - 47.0 %    MCV 88.7 80.0 - 99.0 FL    MCH 25.7 (L) 26.0 - 34.0 PG    MCHC 28.9 (L) 30.0 - 36.5 g/dL    RDW 14.7 (H) 11.5 - 14.5 %    PLATELET 612 827 - 027 K/uL    MPV 10.4 8.9 - 12.9 FL    NRBC 0.0 0.0  WBC    ABSOLUTE NRBC 0.00 0.00 - 0.01 K/uL    NEUTROPHILS 83 (H) 32 - 75 %    LYMPHOCYTES 8 (L) 12 - 49 %    MONOCYTES 9 5 - 13 %    EOSINOPHILS 0 0 - 7 %    BASOPHILS 0 0 - 1 %    IMMATURE GRANULOCYTES 0 0 - 0.5 %    ABS. NEUTROPHILS 5.5 1.8 - 8.0 K/UL    ABS. LYMPHOCYTES 0.6 (L) 0.8 - 3.5 K/UL    ABS. MONOCYTES 0.6 0.0 - 1.0 K/UL    ABS. EOSINOPHILS 0.0 0.0 - 0.4 K/UL    ABS. BASOPHILS 0.0 0.0 - 0.1 K/UL    ABS. IMM. GRANS. 0.0 0.00 - 0.04 K/UL    DF AUTOMATED     METABOLIC PANEL, COMPREHENSIVE    Collection Time: 09/04/22 11:45 AM   Result Value Ref Range    Sodium 140 136 - 145 mmol/L    Potassium 4.0 3.5 - 5.1 mmol/L    Chloride 98 97 - 108 mmol/L    CO2 38 (H) 21 - 32 mmol/L    Anion gap 4 (L) 5 - 15 mmol/L    Glucose 124 (H) 65 - 100 mg/dL    BUN 21 (H) 6 - 20 mg/dL    Creatinine 1.16 (H) 0.55 - 1.02 mg/dL    BUN/Creatinine ratio 18 12 - 20      GFR est AA 56 (L) >60 ml/min/1.73m2    GFR est non-AA 46 (L) >60 ml/min/1.73m2    Calcium 8.8 8.5 - 10.1 mg/dL    Bilirubin, total 0.8 0.2 - 1.0 mg/dL    AST (SGOT) 17 15 - 37 U/L    ALT (SGPT) 16 12 - 78 U/L    Alk.  phosphatase 149 (H) 45 - 117 U/L    Protein, total 7.0 6.4 - 8.2 g/dL    Albumin 3.5 3.5 - 5.0 g/dL    Globulin 3.5 2.0 - 4.0 g/dL    A-G Ratio 1.0 (L) 1.1 - 2.2     TROPONIN-HIGH SENSITIVITY    Collection Time: 09/04/22 11:45 AM   Result Value Ref Range    Troponin-High Sensitivity 23 0 - 51 ng/L   EKG, 12 LEAD, INITIAL    Collection Time: 09/04/22 11:51 AM   Result Value Ref Range    Ventricular Rate 103 BPM    Atrial Rate 107 BPM    QRS Duration 138 ms    Q-T Interval 374 ms    QTC Calculation (Bezet) 489 ms    Calculated R Axis -109 degrees    Calculated T Axis 53 degrees    Diagnosis       Atrial fibrillation with rapid ventricular response  Right superior axis deviation  Non-specific intra-ventricular conduction block  Abnormal ECG  When compared with ECG of 04-SEP-2022 11:50, (Unconfirmed)  No significant change was found  Confirmed by Zehra Turner MD, Landen Natarajan [id number]) on 9/4/2022 2:02:40 PM     NT-PRO BNP    Collection Time: 09/04/22 12:45 PM   Result Value Ref Range    NT pro-BNP 20,981 (H) <949 pg/mL   METABOLIC PANEL, BASIC    Collection Time: 09/05/22  5:25 AM   Result Value Ref Range    Sodium 139 136 - 145 mmol/L    Potassium 3.8 3.5 - 5.1 mmol/L    Chloride 98 97 - 108 mmol/L    CO2 39 (H) 21 - 32 mmol/L    Anion gap 2 (L) 5 - 15 mmol/L    Glucose 169 (H) 65 - 100 mg/dL    BUN 28 (H) 6 - 20 mg/dL    Creatinine 1.20 (H) 0.55 - 1.02 mg/dL    BUN/Creatinine ratio 23 (H) 12 - 20      GFR est AA 54 (L) >60 ml/min/1.73m2    GFR est non-AA 44 (L) >60 ml/min/1.73m2    Calcium 9.0 8.5 - 10.1 mg/dL        XR CHEST PORT   Final Result      Moderate right pleural effusion   Cardiomegaly           PHYSICAL EXAM:    Physical Exam  Constitutional:       General: She is not in acute distress. Appearance: Normal appearance. HENT:      Head: Atraumatic. Eyes:      Extraocular Movements: Extraocular movements intact. Conjunctiva/sclera: Conjunctivae normal.   Cardiovascular:      Rate and Rhythm: Normal rate. Pulses: Normal pulses. Pulmonary:      Effort: Pulmonary effort is normal.      Breath sounds: Wheezing present. Comments: On 2L nasal cannula  Abdominal:      Palpations: Abdomen is soft. Tenderness: There is no abdominal tenderness. There is no guarding. Musculoskeletal:         General: Swelling present.  Normal range of motion. Cervical back: Neck supple. No tenderness. Comments: Mild edema of bilateral lower extremities    Neurological:      Mental Status: She is alert and oriented to person, place, and time. Mental status is at baseline. Psychiatric:         Mood and Affect: Mood normal.        Data Review    Recent Results (from the past 24 hour(s))   CBC WITH AUTOMATED DIFF    Collection Time: 09/04/22 11:45 AM   Result Value Ref Range    WBC 6.8 3.6 - 11.0 K/uL    RBC 4.44 3.80 - 5.20 M/uL    HGB 11.4 (L) 11.5 - 16.0 g/dL    HCT 39.4 35.0 - 47.0 %    MCV 88.7 80.0 - 99.0 FL    MCH 25.7 (L) 26.0 - 34.0 PG    MCHC 28.9 (L) 30.0 - 36.5 g/dL    RDW 14.7 (H) 11.5 - 14.5 %    PLATELET 535 917 - 863 K/uL    MPV 10.4 8.9 - 12.9 FL    NRBC 0.0 0.0  WBC    ABSOLUTE NRBC 0.00 0.00 - 0.01 K/uL    NEUTROPHILS 83 (H) 32 - 75 %    LYMPHOCYTES 8 (L) 12 - 49 %    MONOCYTES 9 5 - 13 %    EOSINOPHILS 0 0 - 7 %    BASOPHILS 0 0 - 1 %    IMMATURE GRANULOCYTES 0 0 - 0.5 %    ABS. NEUTROPHILS 5.5 1.8 - 8.0 K/UL    ABS. LYMPHOCYTES 0.6 (L) 0.8 - 3.5 K/UL    ABS. MONOCYTES 0.6 0.0 - 1.0 K/UL    ABS. EOSINOPHILS 0.0 0.0 - 0.4 K/UL    ABS. BASOPHILS 0.0 0.0 - 0.1 K/UL    ABS. IMM. GRANS. 0.0 0.00 - 0.04 K/UL    DF AUTOMATED     METABOLIC PANEL, COMPREHENSIVE    Collection Time: 09/04/22 11:45 AM   Result Value Ref Range    Sodium 140 136 - 145 mmol/L    Potassium 4.0 3.5 - 5.1 mmol/L    Chloride 98 97 - 108 mmol/L    CO2 38 (H) 21 - 32 mmol/L    Anion gap 4 (L) 5 - 15 mmol/L    Glucose 124 (H) 65 - 100 mg/dL    BUN 21 (H) 6 - 20 mg/dL    Creatinine 1.16 (H) 0.55 - 1.02 mg/dL    BUN/Creatinine ratio 18 12 - 20      GFR est AA 56 (L) >60 ml/min/1.73m2    GFR est non-AA 46 (L) >60 ml/min/1.73m2    Calcium 8.8 8.5 - 10.1 mg/dL    Bilirubin, total 0.8 0.2 - 1.0 mg/dL    AST (SGOT) 17 15 - 37 U/L    ALT (SGPT) 16 12 - 78 U/L    Alk.  phosphatase 149 (H) 45 - 117 U/L    Protein, total 7.0 6.4 - 8.2 g/dL    Albumin 3.5 3.5 - 5.0 g/dL Globulin 3.5 2.0 - 4.0 g/dL    A-G Ratio 1.0 (L) 1.1 - 2.2     TROPONIN-HIGH SENSITIVITY    Collection Time: 09/04/22 11:45 AM   Result Value Ref Range    Troponin-High Sensitivity 23 0 - 51 ng/L   EKG, 12 LEAD, INITIAL    Collection Time: 09/04/22 11:51 AM   Result Value Ref Range    Ventricular Rate 103 BPM    Atrial Rate 107 BPM    QRS Duration 138 ms    Q-T Interval 374 ms    QTC Calculation (Bezet) 489 ms    Calculated R Axis -109 degrees    Calculated T Axis 53 degrees    Diagnosis       Atrial fibrillation with rapid ventricular response  Right superior axis deviation  Non-specific intra-ventricular conduction block  Abnormal ECG  When compared with ECG of 04-SEP-2022 11:50, (Unconfirmed)  No significant change was found  Confirmed by Jesika Tucker MD, Kirk Bo [id number]) on 9/4/2022 2:02:40 PM     NT-PRO BNP    Collection Time: 09/04/22 12:45 PM   Result Value Ref Range    NT pro-BNP 20,981 (H) <098 pg/mL   METABOLIC PANEL, BASIC    Collection Time: 09/05/22  5:25 AM   Result Value Ref Range    Sodium 139 136 - 145 mmol/L    Potassium 3.8 3.5 - 5.1 mmol/L    Chloride 98 97 - 108 mmol/L    CO2 39 (H) 21 - 32 mmol/L    Anion gap 2 (L) 5 - 15 mmol/L    Glucose 169 (H) 65 - 100 mg/dL    BUN 28 (H) 6 - 20 mg/dL    Creatinine 1.20 (H) 0.55 - 1.02 mg/dL    BUN/Creatinine ratio 23 (H) 12 - 20      GFR est AA 54 (L) >60 ml/min/1.73m2    GFR est non-AA 44 (L) >60 ml/min/1.73m2    Calcium 9.0 8.5 - 10.1 mg/dL        Assessment/Plan:     Active Problems:    CHF (congestive heart failure) ([facility]) (7/2/2021)    Patient is a 80year old female with a history of A-fib, COPD, chronic systolic heart failure, hypertension, hypothyroidism and tobacco use, who presented to the emergency department for evaluation of shortness of breath and cough for the past few days. Patient is on 2L nasal cannula at home per baseline but reports she had to increase to 3.5L for the past few days due to worsening shortness of breath.  Patient had CXR showing moderate right pleural effusion and cardiomegaly. She was also found to be in Afib with RVR. Currently on Eliquis and starting therapeutic Lovenox for possible right thoracentesis. Continue aggressive diuretics    Acute respiratory failure with Hypoxia  On 2L nasal cannula, night oxygen at home  Duoneb   Solumedrol   Pulmonology consult pending    CHF  ASA  Lasix   Lisinopril   Continue amiodarone  CXR showing moderate right pleural effusion and cardiomegaly   Most recent echo on 6/23/17 shows EF of 30-35%  Repeat echocardiogram pending  Cardiology consult pending    Atrial fibrillation with RVR  On eliquis  On amiodarone   Metoprolol IV for HR greater than 130  Continue to monitor    Hypothyroidism  On levothyroxine   Previous TSH low consistent with hyperthyroidism, repeat TSH    5. Tobacco use  Nicotine patch    Code status: full code  DVT prophylaxis: Lovenox therapeutic  Ulcer prophylaxis: Protonix       Care Plan discussed with: Patient/Family  Total time spent with patient: >35 minutes.

## 2022-09-05 NOTE — PROGRESS NOTES
Problem: General Medical Care Plan  Goal: *Vital signs within specified parameters  Outcome: Progressing Towards Goal  Goal: *Labs within defined limits  Outcome: Progressing Towards Goal  Goal: *Absence of infection signs and symptoms  Outcome: Progressing Towards Goal  Goal: *Skin integrity maintained  Outcome: Progressing Towards Goal  Goal: *Fluid volume balance  Outcome: Progressing Towards Goal

## 2022-09-05 NOTE — CONSULTS
Pulmonary/ CC Consult    Subjective:   Date of Consultation:  September 5, 2022  Referring Physician: Arielle Liriano MD    Ms. Carmen Spence is a 80years old  female who is known to have history of COPD from ongoing smoking. She still smokes on active basis, additionally she has history of hypothyroidism, CHF and atrial fibrillation. She presented in the emergency department because of increasing shortness of breath that has been going on for last couple of days. She was experiencing some symptoms of wheezing as well. Lately she has been developing increasing leg edema. At baseline she is on 2 L of oxygen at home which was not helping her. In ER her proBNP level was elevated at 20 K she was also noted to be in atrial fibrillation  She ran out of her inhalers and did not get them filled because of expense involved  Her chest x-ray has shown moderate pleural effusion. She denies any fever or chills. 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  Authorizing Provider   levothyroxine (SYNTHROID) 25 mcg tablet Take 1 Tablet by mouth daily. 6/15/22  Yes Provider, Historical   omeprazole (PRILOSEC) 20 mg capsule Take 1 Capsule by mouth daily. 22  Yes Provider, Historical   furosemide (LASIX) 80 mg tablet Take 1 Tablet by mouth daily. 7/3/21  Yes Nithin Werner MD   atorvastatin (LIPITOR) 20 mg tablet TAKE 1 TABLET BY MOUTH EVERY DAY 19  Yes Provider, Historical   ferrous sulfate (IRON PO) Take  by mouth. Yes Provider, Historical   lisinopril (PRINIVIL, ZESTRIL) 5 mg tablet Take 5 mg by mouth daily. 18  Yes Provider, Historical   amiodarone (CORDARONE) 200 mg tablet Take 200 mg by mouth daily. 18  Yes Provider, Historical   ELIQUIS 5 mg tablet Take 10 mg by mouth daily. 18  Yes Provider, Historical   ergocalciferol (ERGOCALCIFEROL) 50,000 unit capsule TAKE 1 CAPSULE BY MOUTH ONE TIME PER WEEK 19   Provider, Historical     No Known Allergies     Review of Systems:  A comprehensive review of systems was negative except for that written in the History of Present Illness. Objective:   Blood pressure 125/76, pulse (!) 102, temperature 98.1 °F (36.7 °C), resp. rate 20, height 5' 4\" (1.626 m), weight 87.3 kg (192 lb 7.4 oz), SpO2 97 %, not currently breastfeeding. Temp (24hrs), Av.9 °F (36.6 °C), Min:97.5 °F (36.4 °C), Max:98.4 °F (36.9 °C)    XR CHEST PORT   Final Result      Moderate right pleural effusion   Cardiomegaly         Data Review: CBC:   Recent Labs     22  1145   WBC 6.8   RBC 4.44   HGB 11.4*   HCT 39.4      GRANS 83*   LYMPH 8*   EOS 0     CMP:   Recent Labs     22  0525 22  1145   * 124*    140   K 3.8 4.0   CL 98 98   CO2 39* 38*   BUN 28* 21*   CREA 1.20* 1.16*   CA 9.0 8.8   AGAP 2* 4*   BUCR 23* 18   AP  --  149*   TP  --  7.0   ALB  --  3.5   GLOB  --  3.5   AGRAT  --  1.0*     Liver Enzymes:   Recent Labs     22  1145   TP 7.0   ALB 3.5   *     ABGs: No results for input(s): PH, PCO2, PO2, HCO3 in the last 72 hours.   Current Facility-Administered Medications   Medication Dose Route Frequency    amiodarone (CORDARONE) tablet 200 mg  200 mg Oral DAILY    [Held by provider] apixaban (ELIQUIS) tablet 5 mg  5 mg Oral BID    atorvastatin (LIPITOR) tablet 20 mg  20 mg Oral DAILY    [Held by provider] furosemide (LASIX) tablet 80 mg  80 mg Oral DAILY    levothyroxine (SYNTHROID) tablet 25 mcg  25 mcg Oral DAILY    pantoprazole (PROTONIX) tablet 40 mg  40 mg Oral DAILY    furosemide (LASIX) injection 40 mg  40 mg IntraVENous BID    enoxaparin (LOVENOX) injection 90 mg  1 mg/kg SubCUTAneous Q12H    methylPREDNISolone (PF) (SOLU-MEDROL) injection 40 mg  40 mg IntraVENous Q8H    ondansetron (ZOFRAN) injection 4 mg  4 mg IntraVENous Q6H PRN    acetaminophen (TYLENOL) tablet 650 mg  650 mg Oral Q6H PRN    docusate sodium (COLACE) capsule 100 mg  100 mg Oral PRN    albuterol-ipratropium (DUO-NEB) 2.5 MG-0.5 MG/3 ML  3 mL Nebulization BID RT        Exam:      This is an elderly female who is alert and oriented. She is on nasal cannula oxygen. Pupils are round responsive to light sclera anicteric and conjunctive are pink and  Neck is supple no cervical lymphadenopathy. Thyroid not enlarged  Chest: Decreased air entry at lung bases, few inspiratory crackles appreciated. No significant wheezing was appreciated  Heart: Patient is in atrial fibrillation with irregularly irregular heart rate   Abdomen: Soft, nontender, no visceromegaly  Extremities: 2+ edema in the lower extremities:  Neuro: No focal motor deficit    Impression:   Ms. Candie Carson is a 80years old  female who is known to have history of COPD from ongoing smoking. She still smokes on active basis, additionally she has history of hypothyroidism, CHF and atrial fibrillation. She presented in the emergency department because of increasing shortness of breath that has been going on for last couple of days. She was experiencing some symptoms of wheezing as well.   Lately she has been developing increasing leg edema. At baseline she is on 2 L of oxygen at home which was not helping her. In ER her proBNP level was elevated at 20 K she was also noted to be in atrial fibrillation  She ran out of her inhalers and did not get them filled because of expense involved  Her chest x-ray has shown moderate pleural effusion. She denies any fever or chills. Plan:   1. Acute dyspnea: This is secondary to combination of atrial fibrillation with rapid ventricular response along with COPD exacerbation. She is on nasal cannula oxygen at 3 L/min. #2. COPD:  Does not follow with any pulmonary physician  On systemic steroids along with bronchodilators. Wheezing is not a dominant complaint at this stage  3. Atrial fibrillation :  Is on amiodarone. 4.  Ongoing smoking:  She is counseled to quit smoking. 5.  CHF  Is on diuretics, therapeutic Lovenox  2D echocardiogram to further evaluate left ventricular systolic function  6. Right pleural effusion  From congestive heart failure.   Will manage with diuresis    Patient given an appointment office follow-up on September 21    Thanks for involving me in the management of your patient      Blanche Mares MD  [facility]

## 2022-09-05 NOTE — CONSULTS
[facility] CARDIOLOGY  CARDIOLOGY CONSULTATION      REASON FOR CONSULT: CHF exacerbation    REQUESTING PROVIDER: Lisa Barker MD    CHIEF COMPLAINT:  Shortness of breathy    HISTORY OF PRESENT ILLNESS:  Janelle Mayes is a 80y.o. year-old female with past medical history significant for NICM with HFrEF, persistent atrial fibrillation who was evaluated today due to decompensated heart failure. Records from hospital admission course thus far reviewed. Patient used to follow with [facility] but has not been seen in office since 2020, missing several appointments. She has not followed with any cardiologist since. She has a long history of NICM and has had several exacerbations requiring hospitalization, diuresis and discharge. She presents in a similar situation yesterday with dyspnea, orthopnea and PND worsening for several days. She denies chest pain, palpitations, lightheadedness, syncope or bleeding and reports compliance with anticoagulation and amiodarone. She believes her AF is paroxysmal though it has not been adequately evaluated. Telemetry reviewed. No events overnight. AF with brief periods of RVR . INPATIENT MEDICATIONS:  Home medications reviewed.     Current Facility-Administered Medications:     lisinopriL (PRINIVIL, ZESTRIL) tablet 5 mg, 5 mg, Oral, DAILY, Donna HERNANDEZ MD, 5 mg at 09/05/22 9515    amiodarone (CORDARONE) tablet 200 mg, 200 mg, Oral, DAILY, Mell Parsons MD, 200 mg at 09/05/22 0854    [Held by provider] apixaban (ELIQUIS) tablet 5 mg, 5 mg, Oral, BID, Mell Parsons MD, 5 mg at 09/05/22 1023    atorvastatin (LIPITOR) tablet 20 mg, 20 mg, Oral, DAILY, Mell Parsons MD, 20 mg at 09/05/22 0854    [Held by provider] furosemide (LASIX) tablet 80 mg, 80 mg, Oral, DAILY, Claudio Green MD    levothyroxine (SYNTHROID) tablet 25 mcg, 25 mcg, Oral, DAILY, Claudio Corona MD, 25 mcg at 09/05/22 0854    pantoprazole (PROTONIX) tablet 40 mg, 40 mg, Oral, DAILY, Ryan Aguirre MD, 40 mg at 09/05/22 0853    furosemide (LASIX) injection 40 mg, 40 mg, IntraVENous, BID, Eddy Sanchez PA-C    enoxaparin (LOVENOX) injection 90 mg, 1 mg/kg, SubCUTAneous, Q12H, Eddy Sanchez PA-C    methylPREDNISolone (PF) (SOLU-MEDROL) injection 40 mg, 40 mg, IntraVENous, Q8H, Mark Green MD, 40 mg at 09/05/22 0502    ondansetron (ZOFRAN) injection 4 mg, 4 mg, IntraVENous, Q6H PRN, Mark Meza MD    acetaminophen (TYLENOL) tablet 650 mg, 650 mg, Oral, Q6H PRN, Mark Meza MD    docusate sodium (COLACE) capsule 100 mg, 100 mg, Oral, PRN, Ryan Aguirre MD    albuterol-ipratropium (DUO-NEB) 2.5 MG-0.5 MG/3 ML, 3 mL, Nebulization, BID RT, Arben Balbuena MD, 3 mL at 09/05/22 6039     ALLERGIES:  Allergies reviewed with the patient,No Known Allergies . FAMILY HISTORY:    Family History   Problem Relation Age of Onset    Heart Attack Mother     Lung Cancer Father          SOCIAL HISTORY:    Social History     Tobacco Use    Smoking status: Every Day     Packs/day: 0.50     Types: Cigarettes    Smokeless tobacco: Never   Substance Use Topics    Alcohol use: No    Drug use: No            REVIEW OF SYSTEMS:  Complete review of systems performed, pertinents noted above, all other systems are negative.       PHYSICAL EXAMINATION:      Visit Vitals  /76 (BP Patient Position: At rest;Semi fowlers)   Pulse (!) 102   Temp 98.1 °F (36.7 °C)   Resp 20   Ht 5' 4\" (1.626 m)   Wt 87.3 kg (192 lb 7.4 oz)   SpO2 97%   Breastfeeding No   BMI 33.04 kg/m²     General: awake, alert; appears to be of stated age; normal body habitus; non-toxic appearing; in no acute distress; cooperative and responding appropriately to questions; comfortable lying in hospital bed  HEENT: conjunctivae not injected; sclera anicteric; mucous membranes moist  Neck: supple; ++ JVD  Heart: irregular rate and rhythm; normal S1 and S2 heart sounds; no murmurs/rubs/gallops or ejection clicks appreciated  Lungs:  decreased breath sounds, occasional crackles  Abdomen: soft, non-tender, non-distended; active bowel sounds present; no hepatomegaly appreciated  Extremities: warm and well perfused x 4; no gross deformities; +2 edema  Skin: normal skin color, texture, and turgor; no lesions or rashes, no petechiae or purpura; no cyanosis; no clubbing of the fingernails  Neurologic: no gross focal deficits        Recent labs results and imaging reviewed. Notable findings include   Lab Results   Component Value Date/Time    WBC 6.8 09/04/2022 11:45 AM    HGB 11.4 (L) 09/04/2022 11:45 AM    HCT 39.4 09/04/2022 11:45 AM    PLATELET 424 95/80/8757 11:45 AM    MCV 88.7 09/04/2022 11:45 AM     Lab Results   Component Value Date/Time    GFR est non-AA 44 (L) 09/05/2022 05:25 AM    GFR est AA 54 (L) 09/05/2022 05:25 AM    Creatinine 1.20 (H) 09/05/2022 05:25 AM    BUN 28 (H) 09/05/2022 05:25 AM    Sodium 139 09/05/2022 05:25 AM    Potassium 3.8 09/05/2022 05:25 AM    Chloride 98 09/05/2022 05:25 AM    CO2 39 (H) 09/05/2022 05:25 AM    .       IMPRESSION AND RECOMMENDATIONS:  Acute decompensated heart failure  Non ischemic cardiomyopathy with heart failure with reduced ejection fraction  Persistent atrial fibrillation  CHADSVASC 3  COPD with possible exacerbation  Right pleural effusion  Hypothyroidism  Chronic amiodarone use  Hypertension  Tobacco abuse    - Agree with diuresis and echocardiogram  - Apixaban held and pt on therapeutic lovenox, will continue until echo back, if LVEF worse may need a cardiac cath  - Ideally should be ARB or better yet ARNI. Will stop lisinopril for washout  - Continue amiodarone for now, but may stop it before end of hospitalization if AF is persistent as rhythm control is not working. Thank you for involving us in the care of this patient. Please do not hesitate to call if additional questions arise.       Opal Martínez MD  9/5/2022

## 2022-09-06 ENCOUNTER — APPOINTMENT (OUTPATIENT)
Dept: INTERVENTIONAL RADIOLOGY/VASCULAR | Age: 72
DRG: 291 | End: 2022-09-06
Attending: PHYSICIAN ASSISTANT
Payer: MEDICARE

## 2022-09-06 ENCOUNTER — APPOINTMENT (OUTPATIENT)
Dept: NON INVASIVE DIAGNOSTICS | Age: 72
DRG: 291 | End: 2022-09-06
Attending: INTERNAL MEDICINE
Payer: MEDICARE

## 2022-09-06 LAB
ALBUMIN SERPL-MCNC: 3.6 G/DL (ref 3.5–5)
ALBUMIN/GLOB SERPL: 1.1 {RATIO} (ref 1.1–2.2)
ALP SERPL-CCNC: 129 U/L (ref 45–117)
ALT SERPL-CCNC: 20 U/L (ref 12–78)
ANION GAP SERPL CALC-SCNC: 3 MMOL/L (ref 5–15)
AST SERPL W P-5'-P-CCNC: 15 U/L (ref 15–37)
BASOPHILS # BLD: 0 K/UL (ref 0–0.1)
BASOPHILS NFR BLD: 0 % (ref 0–1)
BILIRUB SERPL-MCNC: 0.5 MG/DL (ref 0.2–1)
BUN SERPL-MCNC: 38 MG/DL (ref 6–20)
BUN/CREAT SERPL: 30 (ref 12–20)
CA-I BLD-MCNC: 9.2 MG/DL (ref 8.5–10.1)
CHLORIDE SERPL-SCNC: 94 MMOL/L (ref 97–108)
CO2 SERPL-SCNC: 42 MMOL/L (ref 21–32)
CREAT SERPL-MCNC: 1.28 MG/DL (ref 0.55–1.02)
DIFFERENTIAL METHOD BLD: ABNORMAL
ECHO AO ROOT DIAM: 3 CM
ECHO AO ROOT INDEX: 1.55 CM/M2
ECHO AV PEAK GRADIENT: 10 MMHG
ECHO AV PEAK VELOCITY: 1.6 M/S
ECHO AV VELOCITY RATIO: 0.5
ECHO EST RA PRESSURE: 15 MMHG
ECHO LA DIAMETER INDEX: 2.75 CM/M2
ECHO LA DIAMETER: 5.3 CM
ECHO LA TO AORTIC ROOT RATIO: 1.77
ECHO LV E' LATERAL VELOCITY: 22 CM/S
ECHO LV E' SEPTAL VELOCITY: 8 CM/S
ECHO LV EJECTION FRACTION BIPLANE: 51 % (ref 55–100)
ECHO LV FRACTIONAL SHORTENING: 26 % (ref 28–44)
ECHO LV INTERNAL DIMENSION DIASTOLE INDEX: 2.8 CM/M2
ECHO LV INTERNAL DIMENSION DIASTOLIC: 5.4 CM (ref 3.9–5.3)
ECHO LV INTERNAL DIMENSION SYSTOLIC INDEX: 2.07 CM/M2
ECHO LV INTERNAL DIMENSION SYSTOLIC: 4 CM
ECHO LV IVSD: 1.8 CM (ref 0.6–0.9)
ECHO LV MASS 2D: 380.5 G (ref 67–162)
ECHO LV MASS INDEX 2D: 197.2 G/M2 (ref 43–95)
ECHO LV POSTERIOR WALL DIASTOLIC: 1.3 CM (ref 0.6–0.9)
ECHO LV RELATIVE WALL THICKNESS RATIO: 0.48
ECHO LVOT PEAK GRADIENT: 2 MMHG
ECHO LVOT PEAK VELOCITY: 0.8 M/S
ECHO MV REGURGITANT PEAK GRADIENT: 96 MMHG
ECHO MV REGURGITANT PEAK VELOCITY: 4.9 M/S
ECHO PULMONARY ARTERY END DIASTOLIC PRESSURE: 16 MMHG
ECHO PV MAX VELOCITY: 1.1 M/S
ECHO PV PEAK GRADIENT: 5 MMHG
ECHO PV REGURGITANT MAX VELOCITY: 2 M/S
ECHO PVEIN A DURATION: 123 MS
ECHO PVEIN A VELOCITY: 0.3 M/S
ECHO RIGHT VENTRICULAR SYSTOLIC PRESSURE (RVSP): 57 MMHG
ECHO RV INTERNAL DIMENSION: 4.6 CM
ECHO TV REGURGITANT MAX VELOCITY: 3.25 M/S
ECHO TV REGURGITANT PEAK GRADIENT: 42 MMHG
EOSINOPHIL # BLD: 0 K/UL (ref 0–0.4)
EOSINOPHIL NFR BLD: 0 % (ref 0–7)
ERYTHROCYTE [DISTWIDTH] IN BLOOD BY AUTOMATED COUNT: 14.6 % (ref 11.5–14.5)
GLOBULIN SER CALC-MCNC: 3.4 G/DL (ref 2–4)
GLUCOSE SERPL-MCNC: 123 MG/DL (ref 65–100)
HCT VFR BLD AUTO: 38.5 % (ref 35–47)
HGB BLD-MCNC: 11.1 G/DL (ref 11.5–16)
IMM GRANULOCYTES # BLD AUTO: 0 K/UL (ref 0–0.04)
IMM GRANULOCYTES NFR BLD AUTO: 0 % (ref 0–0.5)
LYMPHOCYTES # BLD: 0.2 K/UL (ref 0.8–3.5)
LYMPHOCYTES NFR BLD: 2 % (ref 12–49)
MCH RBC QN AUTO: 25.2 PG (ref 26–34)
MCHC RBC AUTO-ENTMCNC: 28.8 G/DL (ref 30–36.5)
MCV RBC AUTO: 87.5 FL (ref 80–99)
MONOCYTES # BLD: 0.4 K/UL (ref 0–1)
MONOCYTES NFR BLD: 4 % (ref 5–13)
NEUTS SEG # BLD: 8.4 K/UL (ref 1.8–8)
NEUTS SEG NFR BLD: 94 % (ref 32–75)
NRBC # BLD: 0 K/UL (ref 0–0.01)
NRBC BLD-RTO: 0 PER 100 WBC
PLATELET # BLD AUTO: 174 K/UL (ref 150–400)
PMV BLD AUTO: 11.3 FL (ref 8.9–12.9)
POTASSIUM SERPL-SCNC: 3.5 MMOL/L (ref 3.5–5.1)
PROT SERPL-MCNC: 7 G/DL (ref 6.4–8.2)
RBC # BLD AUTO: 4.4 M/UL (ref 3.8–5.2)
SODIUM SERPL-SCNC: 139 MMOL/L (ref 136–145)
T3FREE SERPL-MCNC: 2.2 PG/ML (ref 2.2–4)
T4 FREE SERPL-MCNC: 2.6 NG/DL (ref 0.8–1.5)
WBC # BLD AUTO: 9 K/UL (ref 3.6–11)

## 2022-09-06 PROCEDURE — 74011250636 HC RX REV CODE- 250/636: Performed by: INTERNAL MEDICINE

## 2022-09-06 PROCEDURE — 0W993ZZ DRAINAGE OF RIGHT PLEURAL CAVITY, PERCUTANEOUS APPROACH: ICD-10-PCS | Performed by: STUDENT IN AN ORGANIZED HEALTH CARE EDUCATION/TRAINING PROGRAM

## 2022-09-06 PROCEDURE — 74011250636 HC RX REV CODE- 250/636: Performed by: PHYSICIAN ASSISTANT

## 2022-09-06 PROCEDURE — 85025 COMPLETE CBC W/AUTO DIFF WBC: CPT

## 2022-09-06 PROCEDURE — C1729 CATH, DRAINAGE: HCPCS

## 2022-09-06 PROCEDURE — 74011250637 HC RX REV CODE- 250/637: Performed by: INTERNAL MEDICINE

## 2022-09-06 PROCEDURE — 94640 AIRWAY INHALATION TREATMENT: CPT

## 2022-09-06 PROCEDURE — 93306 TTE W/DOPPLER COMPLETE: CPT

## 2022-09-06 PROCEDURE — 94761 N-INVAS EAR/PLS OXIMETRY MLT: CPT

## 2022-09-06 PROCEDURE — 74011000250 HC RX REV CODE- 250: Performed by: INTERNAL MEDICINE

## 2022-09-06 PROCEDURE — 77010033678 HC OXYGEN DAILY

## 2022-09-06 PROCEDURE — 80053 COMPREHEN METABOLIC PANEL: CPT

## 2022-09-06 PROCEDURE — 36415 COLL VENOUS BLD VENIPUNCTURE: CPT

## 2022-09-06 PROCEDURE — 65270000029 HC RM PRIVATE

## 2022-09-06 RX ADMIN — METHYLPREDNISOLONE SODIUM SUCCINATE 40 MG: 40 INJECTION, POWDER, FOR SOLUTION INTRAMUSCULAR; INTRAVENOUS at 21:36

## 2022-09-06 RX ADMIN — IPRATROPIUM BROMIDE AND ALBUTEROL SULFATE 3 ML: 2.5; .5 SOLUTION RESPIRATORY (INHALATION) at 07:20

## 2022-09-06 RX ADMIN — ATORVASTATIN CALCIUM 20 MG: 20 TABLET, FILM COATED ORAL at 10:13

## 2022-09-06 RX ADMIN — AMIODARONE HYDROCHLORIDE 200 MG: 200 TABLET ORAL at 10:13

## 2022-09-06 RX ADMIN — APIXABAN 5 MG: 5 TABLET, FILM COATED ORAL at 21:36

## 2022-09-06 RX ADMIN — FUROSEMIDE 40 MG: 10 INJECTION, SOLUTION INTRAMUSCULAR; INTRAVENOUS at 10:13

## 2022-09-06 RX ADMIN — FUROSEMIDE 40 MG: 10 INJECTION, SOLUTION INTRAMUSCULAR; INTRAVENOUS at 21:36

## 2022-09-06 RX ADMIN — PANTOPRAZOLE SODIUM 40 MG: 40 TABLET, DELAYED RELEASE ORAL at 10:13

## 2022-09-06 RX ADMIN — IPRATROPIUM BROMIDE AND ALBUTEROL SULFATE 3 ML: 2.5; .5 SOLUTION RESPIRATORY (INHALATION) at 20:23

## 2022-09-06 RX ADMIN — METHYLPREDNISOLONE SODIUM SUCCINATE 40 MG: 40 INJECTION, POWDER, FOR SOLUTION INTRAMUSCULAR; INTRAVENOUS at 05:11

## 2022-09-06 RX ADMIN — METHYLPREDNISOLONE SODIUM SUCCINATE 40 MG: 40 INJECTION, POWDER, FOR SOLUTION INTRAMUSCULAR; INTRAVENOUS at 17:30

## 2022-09-06 RX ADMIN — LEVOTHYROXINE SODIUM 25 MCG: 0.03 TABLET ORAL at 10:13

## 2022-09-06 NOTE — PROGRESS NOTES
Problem: General Medical Care Plan  Goal: *Vital signs within specified parameters  Outcome: Progressing Towards Goal  Goal: *Labs within defined limits  Outcome: Progressing Towards Goal  Goal: *Skin integrity maintained  Outcome: Progressing Towards Goal  Goal: *Fluid volume balance  Outcome: Progressing Towards Goal     Problem: Falls - Risk of  Goal: *Absence of Falls  Description: Document Maria Isabel Fall Risk and appropriate interventions in the flowsheet.   Outcome: Progressing Towards Goal  Note: Fall Risk Interventions:            Medication Interventions: Teach patient to arise slowly, Patient to call before getting OOB

## 2022-09-06 NOTE — DISCHARGE INSTRUCTIONS
-------------------------------------------------------------------------------------------------------------------------------------------------------------------------------------------------------      Download the Heart Failure Mimbres Yanick: Search in your [employer] ([employer]) or [employer] Yanick [employer] ([employer]): Search for- HF Mimbres Yanick.    [employer]    HF Mimbres is a brand-new phone yanick that helps you track daily symptoms, vitals, mood, energy level and more. You can even add your heart failure care team members to view your data and monitor your condition at home.     HF Mimbres lets you:  Track symptoms, medications and more  Share health information with your health care team  Connect with others living with heart failure  ----------------------------------------------------------------------------------------------------------------------------------------------------------------------------doxycyc

## 2022-09-06 NOTE — NURSE NAVIGATOR
Heart Failure Nurse Navigator: Heart Failure Education    RN performs hand hygiene. RN Introduces herself to the patient and informs the patient of the reasoning for the visit. Patient Verbalizes Understanding for visit, is accepting of discussion    Persons present for Education: Patient    Time Spent: At least 60minutes    Method of teaching:  Teach-back, demonstration, verbal, visual    Education Packets Given: Heart Failure Handbook, Heart Failure symptom management calendar/tracker, Heart Failure Self Check plan, Heart Failure: Partnering Your Treatment Questionnaire, Sodium tracker, and Eat Smart with Nutrition labels.    -Confirmation of working scales & that the patient can read numbers  -Confirmation of support to assist with daily weights if patient unable to perform independently. RN provided education on Daily weights to include same time daily, on same scale, and documenting weights on calendar. RN provided education trigger management/ signs and symptoms of Heart Failure. Patient is advised on Yellow Days to call MD office and on Red Days to come to the ER or call 911. RN provides Nutritional education that includes how to calculate and restrict sodium and fluid intake. Encouraged fresh vegetable choice over canned/processed goods. Demonstrated how to log meals/intake. RN discusses lifestyle changes including cessation of smoking and increasing activity level. In addition, RN discusses the importance of keeping appointments and adherence to guideline directed medical therapies. Medication education provided: types of medication, actions of medications, signs and symptoms of medications, and importance of medication adherence. Patient was able to teach back to the RN the above information. Patient will need reinforcement of education provided. RN-NN informs the patient if her daughter wants the RN-NN to return to discuss education with her, that is fine.  Just ask the nurses for the RN-NN to come back to discuss. Patient verbalizes understanding.

## 2022-09-06 NOTE — PROGRESS NOTES
Pulmonary Progress Note      NAME: Davide Solorzano   :  1950  MRM:  [id number]    Date/Time: 2022  6:04 PM         Subjective:     Patient seen and examined. She is sitting comfortably in chair. She is on 2 L oxygen. She has 2 L oxygen at home as well. She underwent right-sided thoracentesis with removal of 600 mL of fluid. She is feeling better. She had an echocardiogram done also. Discussed below. Cardiology on the case. Past Medical History reviewed and unchanged from Admission History and Physical       Objective:     Physical Exam     Vitals:      Last 24hrs VS reviewed since prior progress note. Most recent are:    Visit Vitals  /65 (BP 1 Location: Left upper arm, BP Patient Position: Sitting)   Pulse 96   Temp 97.8 °F (36.6 °C)   Resp 20   Ht 5' 4\" (1.626 m)   Wt 87.4 kg (192 lb 10.9 oz)   SpO2 94%   Breastfeeding No   BMI 33.07 kg/m²     SpO2 Readings from Last 6 Encounters:   22 94%   21 98%   20 100%   19 97%   18 95%    O2 Flow Rate (L/min): 2 l/min     Intake/Output Summary (Last 24 hours) at 2022 1804  Last data filed at 2022 0458  Gross per 24 hour   Intake --   Output 1000 ml   Net -1000 ml      Exam:       This is an elderly female who is alert and oriented. She is on nasal cannula oxygen. Pupils are round responsive to light, sclera anicteric and conjunctive are pink and  Neck is supple no cervical lymphadenopathy. Thyroid not enlarged  Chest: Decreased air entry at lung bases, few inspiratory crackles appreciated. Breath sounds are diminished in general.  Heart: Patient is in atrial fibrillation with irregularly irregular heart rate   Abdomen: Soft, nontender, no visceromegaly  Extremities: 2+ edema in the lower extremities:  Neuro: No focal motor deficit    IR THORACENTESIS NDL PUNC ASP W IMAGE   Final Result   Successful ultrasound guided right thoracentesis yielding approximately 600 ml   of fluid.          XR CHEST PORT   Final Result      Moderate right pleural effusion   Cardiomegaly          Lab Data Reviewed: (see below)      Medications:  Current Facility-Administered Medications   Medication Dose Route Frequency    amiodarone (CORDARONE) tablet 200 mg  200 mg Oral DAILY    apixaban (ELIQUIS) tablet 5 mg  5 mg Oral BID    atorvastatin (LIPITOR) tablet 20 mg  20 mg Oral DAILY    [Held by provider] furosemide (LASIX) tablet 80 mg  80 mg Oral DAILY    levothyroxine (SYNTHROID) tablet 25 mcg  25 mcg Oral DAILY    pantoprazole (PROTONIX) tablet 40 mg  40 mg Oral DAILY    furosemide (LASIX) injection 40 mg  40 mg IntraVENous BID    methylPREDNISolone (PF) (SOLU-MEDROL) injection 40 mg  40 mg IntraVENous Q8H    ondansetron (ZOFRAN) injection 4 mg  4 mg IntraVENous Q6H PRN    acetaminophen (TYLENOL) tablet 650 mg  650 mg Oral Q6H PRN    docusate sodium (COLACE) capsule 100 mg  100 mg Oral PRN    albuterol-ipratropium (DUO-NEB) 2.5 MG-0.5 MG/3 ML  3 mL Nebulization BID RT       ______________________________________________________________________      Lab Review:     Recent Labs     09/06/22  1213 09/04/22  1145   WBC 9.0 6.8   HGB 11.1* 11.4*   HCT 38.5 39.4    180     Recent Labs     09/06/22  1213 09/05/22  0525 09/04/22  1145    139 140   K 3.5 3.8 4.0   CL 94* 98 98   CO2 42* 39* 38*   * 169* 124*   BUN 38* 28* 21*   CREA 1.28* 1.20* 1.16*   CA 9.2 9.0 8.8   ALB 3.6  --  3.5   ALT 20  --  16     No components found for: GLPOC  No results for input(s): PH, PCO2, PO2, HCO3, FIO2 in the last 72 hours. No results for input(s): INR, INREXT, INREXT in the last 72 hours. Other pertinent lab:          Assessment & Plan:      Ms. Carmenza Soares is a 80years old  female who is known to have history of COPD from ongoing smoking. She still smokes on active basis, additionally she has history of hypothyroidism, CHF and atrial fibrillation.   She presented in the emergency department because of increasing shortness of breath that has been going on for last couple of days. Lately she has been developing increasing leg edema. At baseline she is on 2 L of oxygen at home which was not helping her. In ER her proBNP level was elevated at 20 K she was also noted to be in atrial fibrillation  She ran out of her inhalers and did not get them filled because of expense involved  Her chest x-ray has shown moderate pleural effusion. She denied any fever or chills. 1.  Acute dyspnea: This is secondary to combination of atrial fibrillation with rapid ventricular response along with COPD exacerbation. She is on nasal cannula oxygen at 2 L/min. 2. COPD:  Does not follow with any pulmonary physician  On systemic steroids along with bronchodilators. Wheezing is not a dominant complaint at this stage  3. Atrial fibrillation :  Cardiology is consulted. She had an echocardiogram done today which shows an ejection fraction of 50 to 55%. RVSP is 65. I believe this is due to her advanced lung disease. 4.  Ongoing smoking:  She is counseled to quit smoking. 5.  CHF  Is on diuretics, therapeutic Lovenox    6. Right pleural effusion  From congestive heart failure. Patient underwent right-sided thoracentesis and 600 mL of fluid was removed. Patient given an appointment office follow-up on September 21     Thanks for involving me in the management of your patient. Discussed with the patient and his nursing staff. Records and labs reviewed. More than 30 minutes spent with patient care.      Zhanna Paredes MD

## 2022-09-06 NOTE — PROGRESS NOTES
[facility] Hospitalist Progress Note  Rosalio Yanez MD    Date:2022       Room:[facility]  Patient Name:Kim Fernandez     YOB: 1950     Age:71 y.o.    22 admission and hospital course through 22  Patient is a 80year old female with a history of A-fib, COPD, chronic systolic heart failure, hypertension, hypothyroidism and tobacco use, who presented to the emergency department for evaluation of shortness of breath and cough for the past few days. Patient is on 2L nasal cannula at home per baseline but reports she had to increase to 3.5L for the past few days due to worsening shortness of breath. Patient had CXR showing moderate right pleural effusion and cardiomegaly. She was also found to be in Afib with RVR. Currently on Eliquis and starting therapeutic Lovenox for possible right thoracentesis. Continue aggressive diuretics    22 IR thoracentesis  IMPRESSION  Successful ultrasound guided right thoracentesis yielding approximately 600 ml  of fluid. 22 no new complaints, tolerating medications well  Seen after thoracentesis  Patient reports improvement in her symptoms after the procedure  Echocardiogram completed, results not yet available    Subjective    Subjective:  Symptoms:  Stable. Review of Systems   All other systems reviewed and are negative. Objective         Vitals Last 24 Hours:  TEMPERATURE:  Temp  Av.7 °F (36.5 °C)  Min: 97.5 °F (36.4 °C)  Max: 97.8 °F (36.6 °C)  RESPIRATIONS RANGE: Resp  Av.3  Min: 18  Max: 20  PULSE OXIMETRY RANGE: SpO2  Av.1 %  Min: 91 %  Max: 98 %  PULSE RANGE: Pulse  Av.5  Min: 91  Max: 107  BLOOD PRESSURE RANGE: Systolic (32GHT), QRY:858 , Min:105 , NJF:694   ; Diastolic (71JUA), OSM:63, Min:65, Max:81    I/O (24Hr): Intake/Output Summary (Last 24 hours) at 2022 9445  Last data filed at 2022 0458  Gross per 24 hour   Intake 350 ml   Output 1550 ml   Net -1200 ml     Objective:  General Appearance:  Comfortable. Vital signs: (most recent): Blood pressure 106/65, pulse 98, temperature 97.8 °F (36.6 °C), resp. rate 20, height 5' 4\" (1.626 m), weight 87.4 kg (192 lb 10.9 oz), SpO2 94 %, not currently breastfeeding. HEENT: Normal HEENT exam.    Lungs:  Normal effort. There are rales. Heart: Normal rate. Regular rhythm. Abdomen: Abdomen is soft. Bowel sounds are normal.     Neurological: Patient is alert and oriented to person, place and time. Pupils:  Pupils are equal, round, and reactive to light. Skin:  Warm and dry. Labs/Imaging/Diagnostics    Labs:  CBC:  Recent Labs     09/06/22 1213 09/04/22  1145   WBC 9.0 6.8   RBC 4.40 4.44   HGB 11.1* 11.4*   HCT 38.5 39.4   MCV 87.5 88.7   RDW 14.6* 14.7*    180     CHEMISTRIES:  Recent Labs     09/06/22 1213 09/05/22  0525 09/04/22  1145    139 140   K 3.5 3.8 4.0   CL 94* 98 98   CO2 42* 39* 38*   BUN 38* 28* 21*   CA 9.2 9.0 8.8   PT/INR:No results for input(s): INR, INREXT in the last 72 hours. No lab exists for component: PROTIME  APTT:No results for input(s): APTT in the last 72 hours. LIVER PROFILE:  Recent Labs     09/06/22  1213 09/04/22  1145   AST 15 17   ALT 20 16     Lab Results   Component Value Date/Time    ALT (SGPT) 20 09/06/2022 12:13 PM    AST (SGOT) 15 09/06/2022 12:13 PM    Alk. phosphatase 129 (H) 09/06/2022 12:13 PM    Bilirubin, total 0.5 09/06/2022 12:13 PM       Imaging Last 24 Hours:  IR THORACENTESIS NDL PUNC ASP W IMAGE    Result Date: 9/6/2022  PROCEDURE: Thoracentesis INDICATION: Pleural effusion OPERATING PHYSICIAN: Zoey Dean M.D. ESTIMATED BLOOD LOSS: None SPECIMENS REMOVED: 600 cc of right pleural fluid COMPLICATIONS: None immediate. Procedure and findings: The risks and benefits of the procedure were discussed with the patient. Written consent was obtained. Preliminary ultrasound imaging of the right chest demonstrated a large pleural effusion.  An appropriate site for thoracentesis was marked on the skin. The patient was prepped and draped in sterile fashion. 1% lidocaine was injected locally. A dermatotomy was made. A thoracentesis catheter was then inserted into the pleural space using trocar technique. Approximately 600 ml of clear yellow fluid was obtained. The catheter was then removed. The patient tolerated the procedure well. There were no immediate complications. Successful ultrasound guided right thoracentesis yielding approximately 600 ml of fluid. Assessment//Plan   Active Problems:    CHF (congestive heart failure) ([address] (7/2/2021)      Assessment & Plan    9/4/22 admission and hospital course through 9/5/22  Patient is a 80year old female with a history of A-fib, COPD, chronic systolic heart failure, hypertension, hypothyroidism and tobacco use, who presented to the emergency department for evaluation of shortness of breath and cough for the past few days. Patient is on 2L nasal cannula at home per baseline but reports she had to increase to 3.5L for the past few days due to worsening shortness of breath. Patient had CXR showing moderate right pleural effusion and cardiomegaly. She was also found to be in Afib with RVR. Currently on Eliquis and starting therapeutic Lovenox for possible right thoracentesis. Continue aggressive diuretics    9/6/22 IR thoracentesis  IMPRESSION  Successful ultrasound guided right thoracentesis yielding approximately 600 ml  of fluid. 9/6/22 no new complaints, tolerating medications well  Seen after thoracentesis  Patient reports improvement in her symptoms after the procedure  Echocardiogram completed, results not yet available    ASSESSMENT AND PLAN    1) Acute hypoxic respiratory failure in the setting of COPD exacerbation and pleural effusion. Status post thoracentesis 9/6/22.      Supportive care with respiratory support as needed   Corticosteroids   Bronchodilators    2) Cardiovascular issues including chronic systolic heart failure, hypertension, atrial fibrillation.      Echocardiogram done, results pending   Telemetry monitoring   Amiodarone   Apixaban   Atorvastatin   Furosemide    3) DVT prophylaxis with apixaban    Electronically signed by Keenan Rudolph MD on 9/6/2022 at 2:35 PM

## 2022-09-06 NOTE — PROGRESS NOTES
[facility]  CARDIOLOGY PROGRESS NOTE      HISTORY OF PRESENT ILLNESS:  La Perez is a 80y.o. year-old female with past medical history significant for NICM with HFrEF, persistent atrial fibrillation who was evaluated today due to decompensated heart failure. Records from hospital admission course thus far reviewed. Patient used to follow with [facility] but has not been seen in office since 2020, missing several appointments. She has not followed with any cardiologist since. She has a long history of NICM and has had several exacerbations requiring hospitalization, diuresis and discharge. She presents in a similar situation yesterday with dyspnea, orthopnea and PND worsening for several days. She denies chest pain, palpitations, lightheadedness, syncope or bleeding and reports compliance with anticoagulation and amiodarone. She believes her AF is paroxysmal though it has not been adequately evaluated. 9/6: Awake and alert this am. OOB to chair. Offers no new complaints. S/p thoracentesis this am with 600 mL removed, breathing has improved. Telemetry reviewed. No events overnight. AF with brief periods of RVR . INPATIENT MEDICATIONS:  Home medications reviewed.     Current Facility-Administered Medications:     amiodarone (CORDARONE) tablet 200 mg, 200 mg, Oral, DAILY, Bonita HERNANDEZ MD, 200 mg at 09/06/22 1013    [Held by provider] apixaban (ELIQUIS) tablet 5 mg, 5 mg, Oral, BID, Curtis Healy MD, 5 mg at 09/05/22 0917    atorvastatin (LIPITOR) tablet 20 mg, 20 mg, Oral, DAILY, Curtis Healy MD, 20 mg at 09/06/22 1013    [Held by provider] furosemide (LASIX) tablet 80 mg, 80 mg, Oral, DAILY, Lisa Green MD    levothyroxine (SYNTHROID) tablet 25 mcg, 25 mcg, Oral, DAILY, Curtis Healy MD, 25 mcg at 09/06/22 1013    pantoprazole (PROTONIX) tablet 40 mg, 40 mg, Oral, DAILY, Lisa Connelly MD, 40 mg at 09/06/22 1013    furosemide (LASIX) injection 40 mg, 40 mg, IntraVENous, BID, Swetha Sanchez PA-C, 40 mg at 09/06/22 1013    enoxaparin (LOVENOX) injection 90 mg, 1 mg/kg, SubCUTAneous, Q12H, Swetha Sanchez PA-C, 90 mg at 09/05/22 2046    methylPREDNISolone (PF) (SOLU-MEDROL) injection 40 mg, 40 mg, IntraVENous, Q8H, Jacinda Green MD, 40 mg at 09/06/22 0511    ondansetron (ZOFRAN) injection 4 mg, 4 mg, IntraVENous, Q6H PRN, Arielle Liriano MD    acetaminophen (TYLENOL) tablet 650 mg, 650 mg, Oral, Q6H PRN, Jacinda Rodriguez MD    docusate sodium (COLACE) capsule 100 mg, 100 mg, Oral, PRN, Arielle Liriano MD    albuterol-ipratropium (DUO-NEB) 2.5 MG-0.5 MG/3 ML, 3 mL, Nebulization, BID RT, Dean Roman MD, 3 mL at 09/06/22 0720     ALLERGIES:  Allergies reviewed with the patient,No Known Allergies . FAMILY HISTORY:    Family History   Problem Relation Age of Onset    Heart Attack Mother     Lung Cancer Father          SOCIAL HISTORY:    Social History     Tobacco Use    Smoking status: Every Day     Packs/day: 0.50     Types: Cigarettes    Smokeless tobacco: Never   Substance Use Topics    Alcohol use: No    Drug use: No            REVIEW OF SYSTEMS:  Complete review of systems performed, pertinents noted above, all other systems are negative.       PHYSICAL EXAMINATION:      Visit Vitals  /65 (BP 1 Location: Left upper arm, BP Patient Position: Sitting)   Pulse 96   Temp 97.8 °F (36.6 °C)   Resp 20   Ht 5' 4\" (1.626 m)   Wt 87.4 kg (192 lb 10.9 oz)   SpO2 94%   Breastfeeding No   BMI 33.07 kg/m²     General: awake, alert; appears to be of stated age; normal body habitus; non-toxic appearing; in no acute distress; cooperative and responding appropriately to questions; comfortable lying in hospital bed  HEENT: conjunctivae not injected; sclera anicteric; mucous membranes moist  Neck: supple; ++ JVD  Heart: irregular rate and rhythm; normal S1 and S2 heart sounds; no murmurs/rubs/gallops or ejection clicks appreciated  Lungs:  decreased breath sounds, occasional crackles  Abdomen: soft, non-tender, non-distended; active bowel sounds present; no hepatomegaly appreciated  Extremities: warm and well perfused x 4; no gross deformities; +2 edema  Skin: normal skin color, texture, and turgor; no lesions or rashes, no petechiae or purpura; no cyanosis; no clubbing of the fingernails  Neurologic: no gross focal deficits        Recent labs results and imaging reviewed. Notable findings include   Lab Results   Component Value Date/Time    WBC 6.8 09/04/2022 11:45 AM    HGB 11.4 (L) 09/04/2022 11:45 AM    HCT 39.4 09/04/2022 11:45 AM    PLATELET 009 78/31/8464 11:45 AM    MCV 88.7 09/04/2022 11:45 AM     Lab Results   Component Value Date/Time    GFR est non-AA 44 (L) 09/05/2022 05:25 AM    GFR est AA 54 (L) 09/05/2022 05:25 AM    Creatinine 1.20 (H) 09/05/2022 05:25 AM    BUN 28 (H) 09/05/2022 05:25 AM    Sodium 139 09/05/2022 05:25 AM    Potassium 3.8 09/05/2022 05:25 AM    Chloride 98 09/05/2022 05:25 AM    CO2 39 (H) 09/05/2022 05:25 AM    .       Case was discussed with Dr. Marga Das and our impression and recommendations are as follows: I    Acute decompensated heart failure  Non ischemic cardiomyopathy with heart failure with reduced ejection fraction  Persistent atrial fibrillation  CHADSVASC 3  COPD with possible exacerbation  Right pleural effusion  Hypothyroidism  Chronic amiodarone use  Hypertension  Tobacco abuse    - Continue ediuresis and echocardiogram pending  - Apixaban held and pt on therapeutic lovenox, will continue until echo back, if LVEF worse may need a cardiac cath  - Ideally should be ARB or better yet ARNI. Will stop lisinopril for washout  - Will continue to monitor per tele. Will continue amiodarone for now, but may stop it before end of hospitalization if AF is persistent as rhythm control is not working. Thank you for involving us in the care of this patient.   Please do not hesitate to call if additional questions arise.      Collette Gondola, NP  9/6/2022      Dr. Fernandez Favorite Cardiologist    [facility]  [address]    [address], [address] [facility]  [phone]

## 2022-09-07 LAB
ALBUMIN SERPL-MCNC: 3.4 G/DL (ref 3.5–5)
ALBUMIN/GLOB SERPL: 1.1 {RATIO} (ref 1.1–2.2)
ALP SERPL-CCNC: 115 U/L (ref 45–117)
ALT SERPL-CCNC: 18 U/L (ref 12–78)
ANION GAP SERPL CALC-SCNC: 2 MMOL/L (ref 5–15)
AST SERPL W P-5'-P-CCNC: 14 U/L (ref 15–37)
BASOPHILS # BLD: 0 K/UL (ref 0–0.1)
BASOPHILS NFR BLD: 0 % (ref 0–1)
BILIRUB SERPL-MCNC: 0.5 MG/DL (ref 0.2–1)
BUN SERPL-MCNC: 43 MG/DL (ref 6–20)
BUN/CREAT SERPL: 36 (ref 12–20)
CA-I BLD-MCNC: 9.3 MG/DL (ref 8.5–10.1)
CHLORIDE SERPL-SCNC: 95 MMOL/L (ref 97–108)
CO2 SERPL-SCNC: 41 MMOL/L (ref 21–32)
CREAT SERPL-MCNC: 1.21 MG/DL (ref 0.55–1.02)
DIFFERENTIAL METHOD BLD: ABNORMAL
EOSINOPHIL # BLD: 0 K/UL (ref 0–0.4)
EOSINOPHIL NFR BLD: 0 % (ref 0–7)
ERYTHROCYTE [DISTWIDTH] IN BLOOD BY AUTOMATED COUNT: 14.6 % (ref 11.5–14.5)
GLOBULIN SER CALC-MCNC: 3.2 G/DL (ref 2–4)
GLUCOSE SERPL-MCNC: 148 MG/DL (ref 65–100)
HCT VFR BLD AUTO: 36.4 % (ref 35–47)
HGB BLD-MCNC: 10.7 G/DL (ref 11.5–16)
IMM GRANULOCYTES # BLD AUTO: 0 K/UL (ref 0–0.04)
IMM GRANULOCYTES NFR BLD AUTO: 0 % (ref 0–0.5)
LYMPHOCYTES # BLD: 0.3 K/UL (ref 0.8–3.5)
LYMPHOCYTES NFR BLD: 3 % (ref 12–49)
MCH RBC QN AUTO: 25.5 PG (ref 26–34)
MCHC RBC AUTO-ENTMCNC: 29.4 G/DL (ref 30–36.5)
MCV RBC AUTO: 86.9 FL (ref 80–99)
MONOCYTES # BLD: 0.2 K/UL (ref 0–1)
MONOCYTES NFR BLD: 3 % (ref 5–13)
NEUTS SEG # BLD: 8 K/UL (ref 1.8–8)
NEUTS SEG NFR BLD: 94 % (ref 32–75)
NRBC # BLD: 0 K/UL (ref 0–0.01)
NRBC BLD-RTO: 0 PER 100 WBC
PLATELET # BLD AUTO: 163 K/UL (ref 150–400)
PMV BLD AUTO: 10.9 FL (ref 8.9–12.9)
POTASSIUM SERPL-SCNC: 4 MMOL/L (ref 3.5–5.1)
PROT SERPL-MCNC: 6.6 G/DL (ref 6.4–8.2)
RBC # BLD AUTO: 4.19 M/UL (ref 3.8–5.2)
SODIUM SERPL-SCNC: 138 MMOL/L (ref 136–145)
T3 SERPL-MCNC: 55 NG/DL (ref 71–180)
WBC # BLD AUTO: 8.5 K/UL (ref 3.6–11)

## 2022-09-07 PROCEDURE — 77010033678 HC OXYGEN DAILY

## 2022-09-07 PROCEDURE — 74011636637 HC RX REV CODE- 636/637: Performed by: INTERNAL MEDICINE

## 2022-09-07 PROCEDURE — 36415 COLL VENOUS BLD VENIPUNCTURE: CPT

## 2022-09-07 PROCEDURE — 74011250637 HC RX REV CODE- 250/637: Performed by: NURSE PRACTITIONER

## 2022-09-07 PROCEDURE — 80053 COMPREHEN METABOLIC PANEL: CPT

## 2022-09-07 PROCEDURE — 74011250637 HC RX REV CODE- 250/637: Performed by: INTERNAL MEDICINE

## 2022-09-07 PROCEDURE — 85025 COMPLETE CBC W/AUTO DIFF WBC: CPT

## 2022-09-07 PROCEDURE — 74011250636 HC RX REV CODE- 250/636: Performed by: INTERNAL MEDICINE

## 2022-09-07 PROCEDURE — 94640 AIRWAY INHALATION TREATMENT: CPT

## 2022-09-07 PROCEDURE — 65270000029 HC RM PRIVATE

## 2022-09-07 PROCEDURE — 74011000250 HC RX REV CODE- 250: Performed by: INTERNAL MEDICINE

## 2022-09-07 PROCEDURE — 94761 N-INVAS EAR/PLS OXIMETRY MLT: CPT

## 2022-09-07 RX ORDER — DOXYCYCLINE 100 MG/1
100 CAPSULE ORAL EVERY 12 HOURS
Status: DISCONTINUED | OUTPATIENT
Start: 2022-09-07 | End: 2022-09-08 | Stop reason: HOSPADM

## 2022-09-07 RX ORDER — PREDNISONE 20 MG/1
40 TABLET ORAL
Status: DISCONTINUED | OUTPATIENT
Start: 2022-09-07 | End: 2022-09-08 | Stop reason: HOSPADM

## 2022-09-07 RX ORDER — DOXYCYCLINE 50 MG/1
100 CAPSULE ORAL EVERY 12 HOURS
Status: DISCONTINUED | OUTPATIENT
Start: 2022-09-07 | End: 2022-09-07

## 2022-09-07 RX ORDER — DILTIAZEM HYDROCHLORIDE 30 MG/1
30 TABLET, FILM COATED ORAL EVERY 6 HOURS
Status: DISCONTINUED | OUTPATIENT
Start: 2022-09-07 | End: 2022-09-08

## 2022-09-07 RX ADMIN — APIXABAN 5 MG: 5 TABLET, FILM COATED ORAL at 08:40

## 2022-09-07 RX ADMIN — DILTIAZEM HYDROCHLORIDE 30 MG: 30 TABLET, FILM COATED ORAL at 23:58

## 2022-09-07 RX ADMIN — DILTIAZEM HYDROCHLORIDE 30 MG: 30 TABLET, FILM COATED ORAL at 17:12

## 2022-09-07 RX ADMIN — LEVOTHYROXINE SODIUM 25 MCG: 0.03 TABLET ORAL at 08:41

## 2022-09-07 RX ADMIN — IPRATROPIUM BROMIDE AND ALBUTEROL SULFATE 3 ML: 2.5; .5 SOLUTION RESPIRATORY (INHALATION) at 20:26

## 2022-09-07 RX ADMIN — ATORVASTATIN CALCIUM 20 MG: 20 TABLET, FILM COATED ORAL at 08:41

## 2022-09-07 RX ADMIN — DILTIAZEM HYDROCHLORIDE 30 MG: 30 TABLET, FILM COATED ORAL at 14:53

## 2022-09-07 RX ADMIN — AMIODARONE HYDROCHLORIDE 200 MG: 200 TABLET ORAL at 08:40

## 2022-09-07 RX ADMIN — METHYLPREDNISOLONE SODIUM SUCCINATE 40 MG: 40 INJECTION, POWDER, FOR SOLUTION INTRAMUSCULAR; INTRAVENOUS at 06:03

## 2022-09-07 RX ADMIN — IPRATROPIUM BROMIDE AND ALBUTEROL SULFATE 3 ML: 2.5; .5 SOLUTION RESPIRATORY (INHALATION) at 08:30

## 2022-09-07 RX ADMIN — FUROSEMIDE 80 MG: 40 TABLET ORAL at 08:43

## 2022-09-07 RX ADMIN — APIXABAN 5 MG: 5 TABLET, FILM COATED ORAL at 19:53

## 2022-09-07 RX ADMIN — PREDNISONE 40 MG: 20 TABLET ORAL at 09:23

## 2022-09-07 RX ADMIN — DOXYCYCLINE HYCLATE 100 MG: 100 CAPSULE ORAL at 19:56

## 2022-09-07 RX ADMIN — PANTOPRAZOLE SODIUM 40 MG: 40 TABLET, DELAYED RELEASE ORAL at 08:41

## 2022-09-07 NOTE — PROGRESS NOTES
Comprehensive Nutrition Assessment    Type and Reason for Visit: Initial (MST 2)  PMHx includes COPD, Afib, CHF, HTN, hyperthyroidism. Home O2. NKFA. Nutrition Recommendations/Plan:   Diet Regular, therapeutic -change to 2 gm NA. Monitor and record weights and intake. Bms in I/Os     Malnutrition Assessment:  Malnutrition Status:  Mild malnutrition (09/07/22 1123)    Context:  Chronic illness         Nutrition Assessment:    Pt with SOB, worsening over the last 2 days with lower extremity swelling. Dx CHF. Pro-BNP in ED elevated at 20,981. Chest xray with moderate R pleural effusion and cardiomegaly. s/p r sided thoracentesis 9/6 with 600 ml of fluid removed. Po intake noted >%. Meds: amiodarone, atorvastatin, lasix, solumedrol. Labs: 9/7 HGB 10.7, CO2 41, Gluc 148, BUN 43, Cre 1.21., , K 4.0. Nutrition Related Findings:    No significant clinical findings No n/v/c/d. No c/s issues reported. 2+edema lower extremities. No BM recorded. Wound Type: None      Current Nutrition Intake & Therapies:  ADULT DIET Regular; No Salt Added (3-4 gm)    Anthropometric Measures:  Height: 5' 4\" (162.6 cm)  Ideal Body Weight (IBW): 120 lbs (55 kg)  Current Body Wt:  87.4 kg (192 lb 10.9 oz), 160.6 % IBW. Bed scale  Current BMI (kg/m2): 33.1  BMI Category: Obese class 1 (BMI 30.0-34. 9)       Estimated Daily Nutrient Needs:  Energy Requirements Based On: Kcal/kg  Weight Used for Energy Requirements: Adjusted (adjusted BW 62.7 kg)  Energy (kcal/day): 0472-0444 kcal (25 kcal/kg)  Weight Used for Protein Requirements: Adjusted (adjusted BW 62.7kg)  Protein (g/day): 69 g (1.1g/kg)  Method Used for Fluid Requirements: 1 ml/kcal  Fluid (ml/day): 1567 ml (25 ml/kg)    Nutrition Diagnosis:   No nutrition diagnosis at this time related to   as evidenced by      Nutrition Interventions:   Food and/or Nutrient Delivery: Modify current diet  Nutrition Education/Counseling: No recommendations at this time  Coordination of Nutrition Care: No recommendation at this time       Goals:  Previous Goal Met: Progressing toward goal(s)  Goals: Meet at least 75% of estimated needs, by next RD assessment       Nutrition Monitoring and Evaluation:   Behavioral-Environmental Outcomes: None identified  Food/Nutrient Intake Outcomes: Food and nutrient intake  Physical Signs/Symptoms Outcomes: Fluid status or edema, Weight, Nutrition focused physical findings    Discharge Planning:    No discharge needs at this time    Nate Manzo RD  Contact:[phone]

## 2022-09-07 NOTE — PROGRESS NOTES
Problem: General Medical Care Plan  Goal: *Vital signs within specified parameters  Outcome: Progressing Towards Goal  Goal: *Labs within defined limits  Outcome: Progressing Towards Goal  Goal: *Absence of infection signs and symptoms  Outcome: Progressing Towards Goal  Goal: *Optimal pain control at patient's stated goal  Outcome: Progressing Towards Goal  Goal: *Skin integrity maintained  Outcome: Progressing Towards Goal  Goal: *Fluid volume balance  Outcome: Progressing Towards Goal  Goal: *Optimize nutritional status  Outcome: Progressing Towards Goal  Goal: *Anxiety reduced or absent  Outcome: Progressing Towards Goal  Goal: *Progressive mobility and function (eg: ADL's)  Outcome: Progressing Towards Goal     Problem: Falls - Risk of  Goal: *Absence of Falls  Description: Document Maria Isabel Fall Risk and appropriate interventions in the flowsheet.   Outcome: Progressing Towards Goal  Note: Fall Risk Interventions:            Medication Interventions: Patient to call before getting OOB, Teach patient to arise slowly

## 2022-09-07 NOTE — PROGRESS NOTES
Hospitalist Progress Note    Subjective:   Daily Progress Note: 9/7/2022 2:38 PM    Hospital Course:  Patient is a 80year old female with a history of A-fib, COPD, chronic systolic heart failure, hypertension, hypothyroidism and tobacco use, who presented to the emergency department for evaluation of shortness of breath and cough for the past few days. Patient is on 2L nasal cannula at home per baseline but reports she had to increase to 3.5L for the past few days due to worsening shortness of breath. Patient had CXR showing moderate right pleural effusion and cardiomegaly. She was also found to be in Afib with RVR. Was  on Eliquis at home and started on therapeutic Lovenox for right thoracentesis and possible catheterization, now switched to eliquis. Patient was treated with iv diuretics and iv steroids, with improvement in respiratory status. Patient also had right sided thoracentesis with drainage of 600 cc of pleural effusion. Subjective:  Patient feels much better. I explained to the patient that I will change all her medications to oral pills and if she does well tomorrow, plan is to discharge her tomorrow. Patient agrees to the plan.      Current Facility-Administered Medications   Medication Dose Route Frequency    predniSONE (DELTASONE) tablet 40 mg  40 mg Oral DAILY WITH BREAKFAST    dilTIAZem IR (CARDIZEM) tablet 30 mg  30 mg Oral Q6H    apixaban (ELIQUIS) tablet 5 mg  5 mg Oral BID    atorvastatin (LIPITOR) tablet 20 mg  20 mg Oral DAILY    furosemide (LASIX) tablet 80 mg  80 mg Oral DAILY    levothyroxine (SYNTHROID) tablet 25 mcg  25 mcg Oral DAILY    pantoprazole (PROTONIX) tablet 40 mg  40 mg Oral DAILY    ondansetron (ZOFRAN) injection 4 mg  4 mg IntraVENous Q6H PRN    acetaminophen (TYLENOL) tablet 650 mg  650 mg Oral Q6H PRN    docusate sodium (COLACE) capsule 100 mg  100 mg Oral PRN    albuterol-ipratropium (DUO-NEB) 2.5 MG-0.5 MG/3 ML  3 mL Nebulization BID RT        Review of Systems  Constitutional: No fevers, No chills, No sweats, No fatigue, No Weakness  Eyes: No redness  Ears, nose, mouth, throat, and face: No nasal congestion, No sore throat, No voice change  Respiratory: No Shortness of Breath, No cough, No wheezing  Cardiovascular: No chest pain, No palpitations, No extremity edema  Gastrointestinal: No nausea, No vomiting, No diarrhea, No abdominal pain  Genitourinary: No frequency, No dysuria, No hematuria  Integument/breast: No skin lesion(s)   Neurological: No Confusion, No headaches, No dizziness      Objective:     Visit Vitals  /68 (BP 1 Location: Left upper arm, BP Patient Position: At rest)   Pulse (!) 101   Temp 98.1 °F (36.7 °C)   Resp 20   Ht 5' 4\" (1.626 m)   Wt 87.4 kg (192 lb 10.9 oz)   SpO2 96%   Breastfeeding No   BMI 33.07 kg/m²    O2 Flow Rate (L/min): 2 l/min O2 Device: Nasal cannula    Temp (24hrs), Av.8 °F (36.6 °C), Min:97.5 °F (36.4 °C), Max:98.1 °F (36.7 °C)      No intake/output data recorded.  1901 -  0700  In: -   Out: 1800 [Urine:1800]    PHYSICAL EXAM:  Constitutional: No acute distress  Skin: Extremities and face reveal no rashes. HEENT: Sclerae anicteric. Extra-occular muscles are intact. No oral ulcers. The neck is supple and no masses. Cardiovascular: Regular rate and rhythm. Respiratory:  Clear breath sounds bilaterally with no wheezes, rales, or rhonchi. GI: Abdomen nondistended, soft, and nontender. Normal active bowel sounds. Musculoskeletal: No pitting edema of the lower legs. Able to move all ext  Neurological:  Patient is alert and oriented.  Cranial nerves II-XII grossly intact  Psychiatric: Mood appears appropriate       Data Review    Recent Results (from the past 24 hour(s))   ECHO ADULT COMPLETE    Collection Time: 22  3:28 PM   Result Value Ref Range    AV Peak Velocity 1.6 m/s    AV Peak Gradient 10 mmHg    Aortic Root 3.0 cm    IVSd 1.8 (A) 0.6 - 0.9 cm    LVIDd 5.4 (A) 3.9 - 5.3 cm    LVIDs 4.0 cm LVOT Peak Velocity 0.8 m/s    LVOT Peak Gradient 2 mmHg    LVPWd 1.3 (A) 0.6 - 0.9 cm    LV E' Lateral Velocity 22 cm/s    LV E' Septal Velocity 8 cm/s    LA Diameter 5.3 cm    MR Peak Velocity 4.9 m/s    MR Peak Gradient 96 mmHg    MT Max Velocity 2.0 m/s    Pulmonary Artery EDP 16 mmHg    PV Max Velocity 1.1 m/s    PV Peak Gradient 5 mmHg    Pulm Vein A Duration 123.0 ms    Pulm Vein A Velocity 0.3 m/s    Est. RA Pressure 15 mmHg    RVIDd 4.6 cm    TR Max Velocity 3.25 m/s    TR Peak Gradient 42 mmHg    Fractional Shortening 2D 26 28 - 44 %    LVIDd Index 2.80 cm/m2    LVIDs Index 2.07 cm/m2    LV RWT Ratio 0.48     LV Mass 2D 380.5 (A) 67 - 162 g    LV Mass 2D Index 197.2 (A) 43 - 95 g/m2    LA Size Index 2.75 cm/m2    LA/AO Root Ratio 1.77     Ao Root Index 1.55 cm/m2    AV Velocity Ratio 0.50     RVSP 57 mmHg    EF BP 51 (A) 55 - 100 %   CBC WITH AUTOMATED DIFF    Collection Time: 09/07/22  6:42 AM   Result Value Ref Range    WBC 8.5 3.6 - 11.0 K/uL    RBC 4.19 3.80 - 5.20 M/uL    HGB 10.7 (L) 11.5 - 16.0 g/dL    HCT 36.4 35.0 - 47.0 %    MCV 86.9 80.0 - 99.0 FL    MCH 25.5 (L) 26.0 - 34.0 PG    MCHC 29.4 (L) 30.0 - 36.5 g/dL    RDW 14.6 (H) 11.5 - 14.5 %    PLATELET 708 119 - 410 K/uL    MPV 10.9 8.9 - 12.9 FL    NRBC 0.0 0.0  WBC    ABSOLUTE NRBC 0.00 0.00 - 0.01 K/uL    NEUTROPHILS 94 (H) 32 - 75 %    LYMPHOCYTES 3 (L) 12 - 49 %    MONOCYTES 3 (L) 5 - 13 %    EOSINOPHILS 0 0 - 7 %    BASOPHILS 0 0 - 1 %    IMMATURE GRANULOCYTES 0 0 - 0.5 %    ABS. NEUTROPHILS 8.0 1.8 - 8.0 K/UL    ABS. LYMPHOCYTES 0.3 (L) 0.8 - 3.5 K/UL    ABS. MONOCYTES 0.2 0.0 - 1.0 K/UL    ABS. EOSINOPHILS 0.0 0.0 - 0.4 K/UL    ABS. BASOPHILS 0.0 0.0 - 0.1 K/UL    ABS. IMM.  GRANS. 0.0 0.00 - 0.04 K/UL    DF AUTOMATED     METABOLIC PANEL, COMPREHENSIVE    Collection Time: 09/07/22  6:42 AM   Result Value Ref Range    Sodium 138 136 - 145 mmol/L    Potassium 4.0 3.5 - 5.1 mmol/L    Chloride 95 (L) 97 - 108 mmol/L    CO2 41 (HH) 21 - 32 mmol/L    Anion gap 2 (L) 5 - 15 mmol/L    Glucose 148 (H) 65 - 100 mg/dL    BUN 43 (H) 6 - 20 mg/dL    Creatinine 1.21 (H) 0.55 - 1.02 mg/dL    BUN/Creatinine ratio 36 (H) 12 - 20      GFR est AA 53 (L) >60 ml/min/1.73m2    GFR est non-AA 44 (L) >60 ml/min/1.73m2    Calcium 9.3 8.5 - 10.1 mg/dL    Bilirubin, total 0.5 0.2 - 1.0 mg/dL    AST (SGOT) 14 (L) 15 - 37 U/L    ALT (SGPT) 18 12 - 78 U/L    Alk. phosphatase 115 45 - 117 U/L    Protein, total 6.6 6.4 - 8.2 g/dL    Albumin 3.4 (L) 3.5 - 5.0 g/dL    Globulin 3.2 2.0 - 4.0 g/dL    A-G Ratio 1.1 1.1 - 2.2           Assessment / Plan:  Acute on chronic hypoxic respiratory failure  Combination of atrial fibrillation with rapid ventricular response, acute on chronic diastolic heart failure and Acute exacerbation of COPD  Now back to her home oxygen requirement of 2L    Acute exacerbation of COPD:  Discontinue solumedrol start po prednisone in anticipation of discharge tomorrow    Acute on chronic diastolic heart failure:  Discontinue iv lasix, start po lasix in anticipation of discharge tomorrow. Atrial fibrillation:  Continue eliquis  Continue diltiazem 30 mg po q6h    Right pleural effusion:  S/p right sided thoracentesis, 600 cc fluids removed. 30.0 - 39.9 Obese / Body mass index is 33.07 kg/m². Code status: Full  Prophylaxis:  eliquis  Recommended Disposition: Home w/Family tomorrow. Lasix and steroids changed to po, and started on po diltiazem today, if patient does well on po lasix, steroids and diltiazem, dc tomorro. time spent 35 minutes involving direct patient care as well as reviewing patient's labs and coordination of care with nursing staff     Care Plan discussed with: Patient/Family/RN/Case Management        Total time spent with patient: 35 minutes.

## 2022-09-07 NOTE — PROGRESS NOTES
Skin assessment preformed by this RN and Su Lagos LPN. Patient has scattered bruising on lower extremities but no other skin issues to note at this time.

## 2022-09-07 NOTE — PROGRESS NOTES
CM notified by PT that patient will be recc for [facility], CM met with patient to discuss, patient agreeable, stated her son-in-law was also requesting HH, no preference given, referral sent, awaiting acceptance. CM continues to follow and monitor for needs.

## 2022-09-07 NOTE — PROGRESS NOTES
[facility] CARDIOLOGY  CARDIOLOGY PROGRESS NOTE      HISTORY OF PRESENT ILLNESS:  Tamiko Bucio is a 80y.o. year-old female with past medical history significant for NICM with HFrEF, persistent atrial fibrillation who was evaluated today due to decompensated heart failure. Records from hospital admission course thus far reviewed. Patient used to follow with [facility] but has not been seen in office since 2020, missing several appointments. She has not followed with any cardiologist since. She has a long history of NICM and has had several exacerbations requiring hospitalization, diuresis and discharge. She presents in a similar situation yesterday with dyspnea, orthopnea and PND worsening for several days. She denies chest pain, palpitations, lightheadedness, syncope or bleeding and reports compliance with anticoagulation and amiodarone. She believes her AF is paroxysmal though it has not been adequately evaluated. 9/6: Awake and alert this am. OOB to chair. Offers no new complaints. S/p thoracentesis this am with 600 mL removed, breathing has improved. 9/7: No acute changes overnight. No new cardiac complaints. Telemetry reviewed. No events overnight. AF with brief periods of RVR . INPATIENT MEDICATIONS:  Home medications reviewed.     Current Facility-Administered Medications:     predniSONE (DELTASONE) tablet 40 mg, 40 mg, Oral, DAILY WITH BREAKFAST, Angelica Adams MD, 40 mg at 09/07/22 2124    amiodarone (CORDARONE) tablet 200 mg, 200 mg, Oral, DAILY, Aleena Harris MD, 200 mg at 09/07/22 0840    apixaban (ELIQUIS) tablet 5 mg, 5 mg, Oral, BID, Bill Almaraz MD, 5 mg at 09/07/22 0840    atorvastatin (LIPITOR) tablet 20 mg, 20 mg, Oral, DAILY, Bill Almaraz MD, 20 mg at 09/07/22 0841    furosemide (LASIX) tablet 80 mg, 80 mg, Oral, DAILY, Aleena Harris MD, 80 mg at 09/07/22 0843    levothyroxine (SYNTHROID) tablet 25 mcg, 25 mcg, Oral, DAILY, Bill Almaraz MD, 25 mcg at 09/07/22 0841    pantoprazole (PROTONIX) tablet 40 mg, 40 mg, Oral, DAILY, Gerhard HERNANDEZ MD, 40 mg at 09/07/22 0841    ondansetron (ZOFRAN) injection 4 mg, 4 mg, IntraVENous, Q6H PRN, aLlo Diamond MD    acetaminophen (TYLENOL) tablet 650 mg, 650 mg, Oral, Q6H PRN, Lalo Diamond MD    docusate sodium (COLACE) capsule 100 mg, 100 mg, Oral, PRN, Kathy Clark MD    albuterol-ipratropium (DUO-NEB) 2.5 MG-0.5 MG/3 ML, 3 mL, Nebulization, BID RT, Ke Abdullahi MD, 3 mL at 09/07/22 0830     ALLERGIES:  Allergies reviewed with the patient,No Known Allergies . FAMILY HISTORY:    Family History   Problem Relation Age of Onset    Heart Attack Mother     Lung Cancer Father          SOCIAL HISTORY:    Social History     Tobacco Use    Smoking status: Every Day     Packs/day: 0.50     Types: Cigarettes    Smokeless tobacco: Never   Substance Use Topics    Alcohol use: No    Drug use: No            REVIEW OF SYSTEMS:  Complete review of systems performed, pertinents noted above, all other systems are negative.       PHYSICAL EXAMINATION:      Visit Vitals  /68 (BP 1 Location: Left upper arm, BP Patient Position: At rest)   Pulse (!) 101   Temp 98.1 °F (36.7 °C)   Resp 20   Ht 5' 4\" (1.626 m)   Wt 87.4 kg (192 lb 10.9 oz)   SpO2 96%   Breastfeeding No   BMI 33.07 kg/m²     General: awake, alert; appears to be of stated age; normal body habitus; non-toxic appearing; in no acute distress; cooperative and responding appropriately to questions; comfortable lying in hospital bed  HEENT: conjunctivae not injected; sclera anicteric; mucous membranes moist  Neck: supple; ++ JVD  Heart: irregular rate and rhythm; normal S1 and S2 heart sounds; no murmurs/rubs/gallops or ejection clicks appreciated  Lungs:  decreased breath sounds, occasional crackles  Abdomen: soft, non-tender, non-distended; active bowel sounds present; no hepatomegaly appreciated  Extremities: warm and well perfused x 4; no gross deformities; +2 edema  Skin: normal skin color, texture, and turgor; no lesions or rashes, no petechiae or purpura; no cyanosis; no clubbing of the fingernails  Neurologic: no gross focal deficits        Recent labs results and imaging reviewed. Notable findings include   Lab Results   Component Value Date/Time    WBC 8.5 09/07/2022 06:42 AM    HGB 10.7 (L) 09/07/2022 06:42 AM    HCT 36.4 09/07/2022 06:42 AM    PLATELET 579 28/79/0364 06:42 AM    MCV 86.9 09/07/2022 06:42 AM     Lab Results   Component Value Date/Time    GFR est non-AA 44 (L) 09/07/2022 06:42 AM    GFR est AA 53 (L) 09/07/2022 06:42 AM    Creatinine 1.21 (H) 09/07/2022 06:42 AM    BUN 43 (H) 09/07/2022 06:42 AM    Sodium 138 09/07/2022 06:42 AM    Potassium 4.0 09/07/2022 06:42 AM    Chloride 95 (L) 09/07/2022 06:42 AM    CO2 41 (HH) 09/07/2022 06:42 AM    .       Case was discussed with Dr. Dalia Abdi and our impression and recommendations are as follows: I    Acute decompensated heart failure  Non ischemic cardiomyopathy with heart failure with reduced ejection fraction  Persistent atrial fibrillation  CHADSVASC 3  COPD with possible exacerbation  Right pleural effusion  Hypothyroidism  Chronic amiodarone use  Hypertension  Tobacco abuse    - Continue diuresis and echocardiogram showed -55%, severe MR and TR with severely elevated RVSP 65 mmHG. Patient will need OP workup and evaluation.  - Apixaban held and pt on therapeutic lovenox, will continue until echo back, if LVEF worse may need a cardiac cath  - Ideally should be ARB or better yet ARNI. - Will continue to monitor per tele. Will discontinue amiodarone and start patient on Cardizem 30 mg every 6 hours. Thank you for involving us in the care of this patient. Please do not hesitate to call if additional questions arise. Froilan Chino NP  9/7/2022      Dr. Lyle Gutiérrez Cardiologist    [facility]  [address]    [address]   [address] [facility] [place], [facility] [facility]  [phone]

## 2022-09-07 NOTE — MED STUDENT NOTES
Date of Service:  22                 Pulmonary Progress Note        NAME:            Carmen Spence             :               1950  MRM:              [id number]     Date/Time:      2022  6:04 PM            Subjective:      Patient seen and examined. She is sitting comfortably in chair. She is on 2 L oxygen. She has 2 L oxygen at home as well. She is feeling better, eating dinner, and wants to go home. Patient reports improved shortness of breath. Past Medical History reviewed and unchanged from Admission History and Physical         Objective:      Physical Exam      Vitals:      Last 24hrs VS reviewed since prior progress note. Most recent are:      97.6 °F (36.4 °C) 115 Abnormal  126/69 88 -- At rest 20 90 %           Exam:  This is an elderly female who is alert and oriented. She is on nasal cannula oxygen. Pupils are round responsive to light, sclera anicteric and conjunctive are pink and  Neck is supple no cervical lymphadenopathy. Thyroid not enlarged  Chest: Decreased air entry at lung bases, few inspiratory crackles appreciated.   Breath sounds are diminished in general.  Heart: Patient is in atrial fibrillation with irregularly irregular heart rate   Abdomen: Soft, nontender, no visceromegaly  Extremities: 2+ edema in the lower extremities:  Neuro: No focal motor deficit    Recent labs:   Latest Reference Range & Units 22 12:13 22 06:42   Sodium 136 - 145 mmol/L 139 138   Potassium 3.5 - 5.1 mmol/L 3.5 4.0   Chloride 97 - 108 mmol/L 94 (L) 95 (L)   CO2 21 - 32 mmol/L 42 (HH) 41 (HH)   Anion gap 5 - 15 mmol/L 3 (L) 2 (L)   Glucose 65 - 100 mg/dL 123 (H) 148 (H)   BUN 6 - 20 mg/dL 38 (H) 43 (H)   Creatinine 0.55 - 1.02 mg/dL 1.28 (H) 1.21 (H)   (HH): Data is critically high  (L): Data is abnormally low  (H): Data is abnormally high        Assessment & Plan:       Ms. Carmen Spence is a 80years old  female who is known to have history of COPD from ongoing smoking. She still smokes on active basis, additionally she has history of hypothyroidism, CHF and atrial fibrillation. She presented in the emergency department because of increasing shortness of breath that has been going on for last couple of days. Lately she has been developing increasing leg edema. At baseline she is on 2 L of oxygen at home which was not helping her. In ER her proBNP level was elevated at 20 K she was also noted to be in atrial fibrillation  She ran out of her inhalers and did not get them filled because of expense involved  Her chest x-ray has shown moderate pleural effusion. She denied any fever or chills. 1.  Acute dyspnea: This is secondary to combination of atrial fibrillation with rapid ventricular response along with COPD exacerbation. She is on nasal cannula oxygen at 2 L/min. 2. COPD:  Does not follow with any pulmonary physician  On systemic steroids along with bronchodilators. Wheezing is not a dominant complaint at this stage  3. Atrial fibrillation :  Cardiology is consulted. EF 50 to 55%. RVSP is 65. I believe this is due to her advanced lung disease. 4.  Ongoing smoking:  She is counseled to quit smoking. 5.  CHF  Is on diuretics, therapeutic Lovenox     6. Right pleural effusion  From congestive heart failure. Patient underwent right-sided thoracentesis and 600 mL of fluid was removed. Patient is improved from yesterday. Consider discharge tomorrow. She given an appointment office follow-up on September 21. Thanks for involving me in the management of your patient. Discussed with the patient and his nursing staff. Records and labs reviewed. More than 30 minutes spent with patient care. Pinky Snider MD        *ATTENTION:  This note has been created by a medical student for educational purposes only. Please do not refer to the content of this note for clinical decision-making, billing, or other purposes.   Please see attending physicians note to obtain clinical information on this patient. *

## 2022-09-07 NOTE — PROGRESS NOTES
Pulmonary Progress Note      NAME: Tamiko Bucio   :  1950  MRM:  [id number]    Date/Time: 2022  6:04 PM         Subjective:     Patient seen and examined. She is sitting comfortably in chair. She is on 2 L oxygen. She has 2 L oxygen at home as well. She underwent right-sided thoracentesis with removal of 600 mL of fluid on 2022. She is feeling better. She is now coughing up light tan-colored sputum. Had an echocardiogram done also. Discussed below. Cardiology on the case. Past Medical History reviewed and unchanged from Admission History and Physical       Objective:     Physical Exam     Vitals:      Last 24hrs VS reviewed since prior progress note. Most recent are:    Visit Vitals  /69 (BP 1 Location: Right upper arm, BP Patient Position: At rest)   Pulse (!) 110   Temp 97.6 °F (36.4 °C)   Resp 20   Ht 5' 4\" (1.626 m)   Wt 87.4 kg (192 lb 10.9 oz)   SpO2 90%   Breastfeeding No   BMI 33.07 kg/m²     SpO2 Readings from Last 6 Encounters:   22 90%   21 98%   20 100%   19 97%   18 95%    O2 Flow Rate (L/min): 2 l/min     Intake/Output Summary (Last 24 hours) at 2022 1913  Last data filed at 2022 2143  Gross per 24 hour   Intake --   Output 800 ml   Net -800 ml        Exam:       This is an elderly female who is alert and oriented. She is on nasal cannula oxygen. Pupils are round responsive to light, sclera anicteric and conjunctive are pink and  Neck is supple no cervical lymphadenopathy. Thyroid not enlarged  Chest: Decreased air entry at lung bases, few inspiratory crackles appreciated. Breath sounds are diminished in general.  Occasional expiratory wheeze.   Heart: Patient is in atrial fibrillation with irregularly irregular heart rate   Abdomen: Soft, nontender, no visceromegaly  Extremities: 2+ edema in the lower extremities:  Neuro: No focal motor deficit    IR THORACENTESIS NDL PUNC ASP W IMAGE   Final Result   Successful ultrasound guided right thoracentesis yielding approximately 600 ml   of fluid. XR CHEST PORT   Final Result      Moderate right pleural effusion   Cardiomegaly          Lab Data Reviewed: (see below)      Medications:  Current Facility-Administered Medications   Medication Dose Route Frequency    predniSONE (DELTASONE) tablet 40 mg  40 mg Oral DAILY WITH BREAKFAST    dilTIAZem IR (CARDIZEM) tablet 30 mg  30 mg Oral Q6H    apixaban (ELIQUIS) tablet 5 mg  5 mg Oral BID    atorvastatin (LIPITOR) tablet 20 mg  20 mg Oral DAILY    furosemide (LASIX) tablet 80 mg  80 mg Oral DAILY    levothyroxine (SYNTHROID) tablet 25 mcg  25 mcg Oral DAILY    pantoprazole (PROTONIX) tablet 40 mg  40 mg Oral DAILY    ondansetron (ZOFRAN) injection 4 mg  4 mg IntraVENous Q6H PRN    acetaminophen (TYLENOL) tablet 650 mg  650 mg Oral Q6H PRN    docusate sodium (COLACE) capsule 100 mg  100 mg Oral PRN    albuterol-ipratropium (DUO-NEB) 2.5 MG-0.5 MG/3 ML  3 mL Nebulization BID RT       ______________________________________________________________________      Lab Review:     Recent Labs     09/07/22  0642 09/06/22  1213   WBC 8.5 9.0   HGB 10.7* 11.1*   HCT 36.4 38.5    174       Recent Labs     09/07/22  0642 09/06/22  1213 09/05/22  0525    139 139   K 4.0 3.5 3.8   CL 95* 94* 98   CO2 41* 42* 39*   * 123* 169*   BUN 43* 38* 28*   CREA 1.21* 1.28* 1.20*   CA 9.3 9.2 9.0   ALB 3.4* 3.6  --    ALT 18 20  --        No components found for: GLPOC  No results for input(s): PH, PCO2, PO2, HCO3, FIO2 in the last 72 hours. No results for input(s): INR, INREXT, INREXT, INREXT in the last 72 hours. Other pertinent lab:          Assessment & Plan:      Ms. Magnolia Rebolledo is a 80years old  female who is known to have history of COPD from ongoing smoking. She still smokes on active basis, additionally she has history of hypothyroidism, CHF and atrial fibrillation.   She presented in the emergency department because of increasing shortness of breath that has been going on for last couple of days. Lately she has been developing increasing leg edema. At baseline she is on 2 L of oxygen at home which was not helping her. In ER her proBNP level was elevated at 20 K she was also noted to be in atrial fibrillation  She ran out of her inhalers and did not get them filled because of expense involved  Her chest x-ray has shown moderate pleural effusion. She denied any fever or chills. 1.  Acute dyspnea: This is secondary to combination of atrial fibrillation with rapid ventricular response along with COPD exacerbation. She is on nasal cannula oxygen at 2 L/min. 2. COPD:  Does not follow with any pulmonary physician  On systemic steroids along with bronchodilators. Wheezing is not a dominant complaint at this stage  3. Atrial fibrillation :  Cardiology is consulted. She had an echocardiogram done 9/6/22 which shows an ejection fraction of 50 to 55%. RVSP is 65. I believe pulmonary hypertension is due to her advanced lung disease. She is anticoagulated with Eliquis. 4.  Ongoing smoking:  She is counseled to quit smoking. 5.  CHF  Is on diuretics, therapeutic Lovenox    6. Right pleural effusion  From congestive heart failure. Patient underwent right-sided thoracentesis on 9/6/2022 and 600 mL of fluid was removed. Apparently fluid was not sent for any analysis. From pulmonary standpoint the patient can be discharged home in the morning on tapering dose of prednisone. I will also start her on doxycycline orally to complete 7 days. She is coughing up slightly purulent sputum. Patient has an appointment office follow-up on September 21 with Dr. Ligia Paige. Thanks for involving me in the management of your patient. Discussed with the patient and his nursing staff. Records and labs reviewed. More than 30 minutes spent with patient care.      Dee Dee Santamaria MD

## 2022-09-08 VITALS
DIASTOLIC BLOOD PRESSURE: 65 MMHG | HEART RATE: 100 BPM | TEMPERATURE: 97.9 F | BODY MASS INDEX: 32.59 KG/M2 | WEIGHT: 190.92 LBS | RESPIRATION RATE: 18 BRPM | SYSTOLIC BLOOD PRESSURE: 117 MMHG | OXYGEN SATURATION: 94 % | HEIGHT: 64 IN

## 2022-09-08 LAB
ALBUMIN SERPL-MCNC: 3.4 G/DL (ref 3.5–5)
ALBUMIN/GLOB SERPL: 1.1 {RATIO} (ref 1.1–2.2)
ALP SERPL-CCNC: 103 U/L (ref 45–117)
ALT SERPL-CCNC: 19 U/L (ref 12–78)
ANION GAP SERPL CALC-SCNC: 2 MMOL/L (ref 5–15)
AST SERPL W P-5'-P-CCNC: 14 U/L (ref 15–37)
BASOPHILS # BLD: 0 K/UL (ref 0–0.1)
BASOPHILS NFR BLD: 0 % (ref 0–1)
BILIRUB SERPL-MCNC: 0.7 MG/DL (ref 0.2–1)
BUN SERPL-MCNC: 45 MG/DL (ref 6–20)
BUN/CREAT SERPL: 39 (ref 12–20)
CA-I BLD-MCNC: 9.1 MG/DL (ref 8.5–10.1)
CHLORIDE SERPL-SCNC: 95 MMOL/L (ref 97–108)
CO2 SERPL-SCNC: 44 MMOL/L (ref 21–32)
CREAT SERPL-MCNC: 1.14 MG/DL (ref 0.55–1.02)
DIFFERENTIAL METHOD BLD: ABNORMAL
EOSINOPHIL # BLD: 0 K/UL (ref 0–0.4)
EOSINOPHIL NFR BLD: 0 % (ref 0–7)
ERYTHROCYTE [DISTWIDTH] IN BLOOD BY AUTOMATED COUNT: 14.6 % (ref 11.5–14.5)
GLOBULIN SER CALC-MCNC: 3 G/DL (ref 2–4)
GLUCOSE SERPL-MCNC: 91 MG/DL (ref 65–100)
HCT VFR BLD AUTO: 38 % (ref 35–47)
HGB BLD-MCNC: 11.1 G/DL (ref 11.5–16)
IMM GRANULOCYTES # BLD AUTO: 0 K/UL (ref 0–0.04)
IMM GRANULOCYTES NFR BLD AUTO: 0 % (ref 0–0.5)
LYMPHOCYTES # BLD: 0.8 K/UL (ref 0.8–3.5)
LYMPHOCYTES NFR BLD: 8 % (ref 12–49)
MCH RBC QN AUTO: 25.6 PG (ref 26–34)
MCHC RBC AUTO-ENTMCNC: 29.2 G/DL (ref 30–36.5)
MCV RBC AUTO: 87.6 FL (ref 80–99)
MONOCYTES # BLD: 0.9 K/UL (ref 0–1)
MONOCYTES NFR BLD: 10 % (ref 5–13)
NEUTS SEG # BLD: 7.7 K/UL (ref 1.8–8)
NEUTS SEG NFR BLD: 82 % (ref 32–75)
NRBC # BLD: 0 K/UL (ref 0–0.01)
NRBC BLD-RTO: 0 PER 100 WBC
PLATELET # BLD AUTO: 159 K/UL (ref 150–400)
PMV BLD AUTO: 11.1 FL (ref 8.9–12.9)
POTASSIUM SERPL-SCNC: 3.3 MMOL/L (ref 3.5–5.1)
PROT SERPL-MCNC: 6.4 G/DL (ref 6.4–8.2)
RBC # BLD AUTO: 4.34 M/UL (ref 3.8–5.2)
SODIUM SERPL-SCNC: 141 MMOL/L (ref 136–145)
WBC # BLD AUTO: 9.4 K/UL (ref 3.6–11)

## 2022-09-08 PROCEDURE — 85025 COMPLETE CBC W/AUTO DIFF WBC: CPT

## 2022-09-08 PROCEDURE — 74011250637 HC RX REV CODE- 250/637: Performed by: INTERNAL MEDICINE

## 2022-09-08 PROCEDURE — 94761 N-INVAS EAR/PLS OXIMETRY MLT: CPT

## 2022-09-08 PROCEDURE — 77010033678 HC OXYGEN DAILY

## 2022-09-08 PROCEDURE — 80053 COMPREHEN METABOLIC PANEL: CPT

## 2022-09-08 PROCEDURE — 94640 AIRWAY INHALATION TREATMENT: CPT

## 2022-09-08 PROCEDURE — 74011250637 HC RX REV CODE- 250/637: Performed by: NURSE PRACTITIONER

## 2022-09-08 PROCEDURE — 36415 COLL VENOUS BLD VENIPUNCTURE: CPT

## 2022-09-08 PROCEDURE — 74011000250 HC RX REV CODE- 250: Performed by: INTERNAL MEDICINE

## 2022-09-08 PROCEDURE — 74011636637 HC RX REV CODE- 636/637: Performed by: INTERNAL MEDICINE

## 2022-09-08 RX ORDER — METOPROLOL SUCCINATE 25 MG/1
25 TABLET, EXTENDED RELEASE ORAL DAILY
Qty: 30 TABLET | Refills: 0 | Status: SHIPPED | OUTPATIENT
Start: 2022-09-08

## 2022-09-08 RX ORDER — POTASSIUM CHLORIDE 1.5 G/1.77G
40 POWDER, FOR SOLUTION ORAL ONCE
Qty: 2 PACKET | Refills: 0 | Status: SHIPPED | OUTPATIENT
Start: 2022-09-08 | End: 2022-09-08

## 2022-09-08 RX ORDER — METOPROLOL SUCCINATE 25 MG/1
25 TABLET, EXTENDED RELEASE ORAL DAILY
Status: DISCONTINUED | OUTPATIENT
Start: 2022-09-08 | End: 2022-09-08 | Stop reason: HOSPADM

## 2022-09-08 RX ORDER — POTASSIUM CHLORIDE 1.5 G/1.77G
40 POWDER, FOR SOLUTION ORAL
Status: COMPLETED | OUTPATIENT
Start: 2022-09-08 | End: 2022-09-08

## 2022-09-08 RX ORDER — DOXYCYCLINE 100 MG/1
100 CAPSULE ORAL EVERY 12 HOURS
Qty: 12 CAPSULE | Refills: 0 | Status: SHIPPED | OUTPATIENT
Start: 2022-09-08

## 2022-09-08 RX ORDER — PREDNISONE 20 MG/1
TABLET ORAL
Qty: 17 TABLET | Refills: 0 | Status: SHIPPED | OUTPATIENT
Start: 2022-09-08

## 2022-09-08 RX ADMIN — ATORVASTATIN CALCIUM 20 MG: 20 TABLET, FILM COATED ORAL at 08:31

## 2022-09-08 RX ADMIN — IPRATROPIUM BROMIDE AND ALBUTEROL SULFATE 3 ML: 2.5; .5 SOLUTION RESPIRATORY (INHALATION) at 07:26

## 2022-09-08 RX ADMIN — METOPROLOL SUCCINATE 25 MG: 25 TABLET, EXTENDED RELEASE ORAL at 11:51

## 2022-09-08 RX ADMIN — PANTOPRAZOLE SODIUM 40 MG: 40 TABLET, DELAYED RELEASE ORAL at 08:31

## 2022-09-08 RX ADMIN — FUROSEMIDE 80 MG: 40 TABLET ORAL at 08:31

## 2022-09-08 RX ADMIN — PREDNISONE 40 MG: 20 TABLET ORAL at 08:31

## 2022-09-08 RX ADMIN — APIXABAN 5 MG: 5 TABLET, FILM COATED ORAL at 08:31

## 2022-09-08 RX ADMIN — DOXYCYCLINE HYCLATE 100 MG: 100 CAPSULE ORAL at 08:31

## 2022-09-08 RX ADMIN — LEVOTHYROXINE SODIUM 25 MCG: 0.03 TABLET ORAL at 08:31

## 2022-09-08 RX ADMIN — POTASSIUM CHLORIDE 40 MEQ: 1.5 POWDER, FOR SOLUTION ORAL at 11:51

## 2022-09-08 RX ADMIN — DILTIAZEM HYDROCHLORIDE 30 MG: 30 TABLET, FILM COATED ORAL at 05:36

## 2022-09-08 NOTE — PROGRESS NOTES
Patient is clear to d/c from  to home with [facility], accepted with [facility] 09/09/2022. Patient reports family will transport her home and bring her portable o2. Medicare pt has received, reviewed, and signed 2nd IM letter informing them of their right to appeal the discharge. Signed copied has been placed on pt bedside chart.

## 2022-09-08 NOTE — PROGRESS NOTES
I have reviewed discharge instructions with the patient. The patient verbalized understanding. PIV and telemetry removed.

## 2022-09-08 NOTE — DISCHARGE SUMMARY
Hospitalist Discharge Summary     Patient ID:  Magnolia Rebolledo  [id number]  80 y.o.  1950 9/4/2022    PCP on record: Mukund Ying MD    Admit date: 9/4/2022  Discharge date and time: 9/8/2022    DISCHARGE DIAGNOSIS:  Acute on chronic hypoxic respiratory failure  Acute exacerbation of COPD  Acute on chronic diastolic heart failure  Atrial fibrillation  Right pleural effusion        CONSULTATIONS:  IP CONSULT TO CARDIOLOGY  IP CONSULT TO PULMONOLOGY    Excerpted HPI from H&P of Jose Adams MD:  Patient is a 80year old female with a history of A-fib, COPD, chronic systolic heart failure, hypertension, hypothyroidism and tobacco use, who presented to the emergency department for evaluation of shortness of breath and cough for the past few days. Patient is on 2L nasal cannula at home per baseline but reports she had to increase to 3.5L for the past few days due to worsening shortness of breath.     ______________________________________________________________________  DISCHARGE SUMMARY/HOSPITAL COURSE:  for full details see H&P, daily progress notes, labs, consult notes. Patient had CXR showing moderate right pleural effusion and cardiomegaly. She was also found to be in Afib with RVR. Was  on Eliquis at home and started on therapeutic Lovenox for right thoracentesis and possible catheterization, now switched to eliquis. Patient was treated with iv diuretics and iv steroids, with improvement in respiratory status. Patient also had right sided thoracentesis with drainage of 600 cc of pleural effusion. Patient was switched to po steroids and po lasix. Patient is tolerating po lasix and po steroids, now being discharged on po lasix and tapering dose of steroids. _______________________________________________________________________  Patient seen and examined by me on discharge day. Pertinent Findings:  Gen:    Not in distress  Chest: Clear lungs  CVS:   Regular rhythm.   No edema  Abd:  Soft, not distended, not tender  Neuro:  Alert, oriented  _______________________________________________________________________  DISCHARGE MEDICATIONS:   Discharge Medication List as of 9/8/2022 12:33 PM        START taking these medications    Details   doxycycline (VIBRAMYCIN) 100 mg capsule Take 1 Capsule by mouth every twelve (12) hours. , Normal, Disp-12 Capsule, R-0      metoprolol succinate (TOPROL-XL) 25 mg XL tablet Take 1 Tablet by mouth daily. , Normal, Disp-30 Tablet, R-0      potassium chloride (KLOR-CON) 20 mEq pack Take 2 Packets by mouth once for 1 dose., Normal, Disp-2 Packet, R-0      predniSONE (DELTASONE) 20 mg tablet Take 40 mg po daily for 3 days, then 20 mg po daily for 3 days, and then 10 mg po daily for 3 days, Normal, Disp-17 Tablet, R-0           CONTINUE these medications which have CHANGED    Details   apixaban (Eliquis) 5 mg tablet Take 1 Tablet by mouth two (2) times a day., Normal, Disp-60 Tablet, R-0           CONTINUE these medications which have NOT CHANGED    Details   levothyroxine (SYNTHROID) 25 mcg tablet Take 1 Tablet by mouth daily. , Historical Med      omeprazole (PRILOSEC) 20 mg capsule Take 1 Capsule by mouth daily. , Historical Med      furosemide (LASIX) 80 mg tablet Take 1 Tablet by mouth daily. , Normal, Disp-30 Tablet, R-2      atorvastatin (LIPITOR) 20 mg tablet TAKE 1 TABLET BY MOUTH EVERY DAY, Historical Med      ergocalciferol (ERGOCALCIFEROL) 50,000 unit capsule TAKE 1 CAPSULE BY MOUTH ONE TIME PER WEEK, Historical Med           STOP taking these medications       ferrous sulfate (IRON PO) Comments:   Reason for Stopping:         lisinopril (PRINIVIL, ZESTRIL) 5 mg tablet Comments:   Reason for Stopping:         amiodarone (CORDARONE) 200 mg tablet Comments:   Reason for Stopping:                 Patient Follow Up Instructions: Activity: Activity as tolerated  Diet: Cardiac Diet  Wound Care: None needed    Follow-up with PCP, cardiology in 1 week.   Follow-up tests/labs none  Follow-up Information       Follow up With Specialties Details Why Contact Info    Bob Duran MD Family Medicine  Patient or Family must call and schedule follow-up appointment. [address] [facility] [address] [facility] [address]  [phone]      Sonal Earing Cardiology  Follow up on 9/22/2022 @2:30pm with JODI Rosa [address]  [place] [id number]  [facility] Call in 1 day(s) Anticipated start o care date is 9/9/2022. Please call the agency if you do not hear from them by this date. [address] [employer]  [phone]          ________________________________________________________________    Risk of deterioration: Moderate    Condition at Discharge:  Stable  __________________________________________________________________    Disposition  Home with family and home health services    ____________________________________________________________________    Code Status: Full Code  ___________________________________________________________________      Total time in minutes spent coordinating this discharge (includes going over instructions, follow-up, prescriptions, and preparing report for sign off to her PCP) :  35 minutes    Signed:  Ellis Schaefer MD

## 2022-09-08 NOTE — PROGRESS NOTES
Problem: General Medical Care Plan  Goal: *Vital signs within specified parameters  Outcome: Progressing Towards Goal     Problem: General Medical Care Plan  Goal: *Absence of infection signs and symptoms  Outcome: Progressing Towards Goal     Problem: General Medical Care Plan  Goal: *Optimal pain control at patient's stated goal  Outcome: Progressing Towards Goal     Problem: General Medical Care Plan  Goal: *Optimize nutritional status  Outcome: Progressing Towards Goal

## 2022-09-08 NOTE — PROGRESS NOTES
[facility]  CARDIOLOGY PROGRESS NOTE      HISTORY OF PRESENT ILLNESS:  Yunier Landrum is a 80y.o. year-old female with past medical history significant for NICM with HFrEF, persistent atrial fibrillation who was evaluated today due to decompensated heart failure. Records from hospital admission course thus far reviewed. Patient used to follow with [facility] but has not been seen in office since 2020, missing several appointments. She has not followed with any cardiologist since. She has a long history of NICM and has had several exacerbations requiring hospitalization, diuresis and discharge. She presents in a similar situation yesterday with dyspnea, orthopnea and PND worsening for several days. She denies chest pain, palpitations, lightheadedness, syncope or bleeding and reports compliance with anticoagulation and amiodarone. She believes her AF is paroxysmal though it has not been adequately evaluated. 9/6: Awake and alert this am. OOB to chair. Offers no new complaints. S/p thoracentesis this am with 600 mL removed, breathing has improved. 9/7: No acute changes overnight. No new cardiac complaints. 9/8: Discussed the results of the echocardiogram, addressed all questions and concerns. No other concerns at this time. Telemetry reviewed. No events overnight. AF with brief periods of RVR . INPATIENT MEDICATIONS:  Home medications reviewed.     Current Facility-Administered Medications:     metoprolol succinate (TOPROL-XL) XL tablet 25 mg, 25 mg, Oral, DAILY, Abi Zimmerman NP, 25 mg at 09/08/22 1151    predniSONE (DELTASONE) tablet 40 mg, 40 mg, Oral, DAILY WITH BREAKFAST, Angelica Adams MD, 40 mg at 09/08/22 0831    doxycycline (VIBRAMYCIN) capsule 100 mg, 100 mg, Oral, Q12H, Angelica Adams MD, 100 mg at 09/08/22 0831    apixaban (ELIQUIS) tablet 5 mg, 5 mg, Oral, BID, Kenia Boudreaux MD, 5 mg at 09/08/22 0831    atorvastatin (LIPITOR) tablet 20 mg, 20 mg, Oral, DAILY, Indra Ceballos MD, 20 mg at 09/08/22 0831    furosemide (LASIX) tablet 80 mg, 80 mg, Oral, DAILY, Jade HERNANDEZ MD, 80 mg at 09/08/22 0831    levothyroxine (SYNTHROID) tablet 25 mcg, 25 mcg, Oral, DAILY, Jade HERNANDEZ MD, 25 mcg at 09/08/22 0831    pantoprazole (PROTONIX) tablet 40 mg, 40 mg, Oral, DAILY, Jade HERNANDEZ MD, 40 mg at 09/08/22 0831    ondansetron (ZOFRAN) injection 4 mg, 4 mg, IntraVENous, Q6H PRN, Musa Nagel MD    acetaminophen (TYLENOL) tablet 650 mg, 650 mg, Oral, Q6H PRN, Musa Nagel MD    docusate sodium (COLACE) capsule 100 mg, 100 mg, Oral, PRN, Indra Ceballos MD    albuterol-ipratropium (DUO-NEB) 2.5 MG-0.5 MG/3 ML, 3 mL, Nebulization, BID RT, Shanthi Chen MD, 3 mL at 09/08/22 2116     ALLERGIES:  Allergies reviewed with the patient,No Known Allergies . FAMILY HISTORY:    Family History   Problem Relation Age of Onset    Heart Attack Mother     Lung Cancer Father          SOCIAL HISTORY:    Social History     Tobacco Use    Smoking status: Every Day     Packs/day: 0.50     Types: Cigarettes    Smokeless tobacco: Never   Substance Use Topics    Alcohol use: No    Drug use: No            REVIEW OF SYSTEMS:  Complete review of systems performed, pertinents noted above, all other systems are negative.       PHYSICAL EXAMINATION:      Visit Vitals  /65 (BP 1 Location: Right upper arm, BP Patient Position: At rest;Lying)   Pulse 100   Temp 97.9 °F (36.6 °C)   Resp 18   Ht 5' 4\" (1.626 m)   Wt 86.6 kg (190 lb 14.7 oz)   SpO2 94%   Breastfeeding No   BMI 32.77 kg/m²     General: awake, alert; appears to be of stated age; normal body habitus; non-toxic appearing; in no acute distress; cooperative and responding appropriately to questions; comfortable lying in hospital bed  HEENT: conjunctivae not injected; sclera anicteric; mucous membranes moist  Neck: supple; ++ JVD  Heart: irregular rate and rhythm; normal S1 and S2 heart sounds; no murmurs/rubs/gallops or ejection clicks appreciated  Lungs:  decreased breath sounds, occasional crackles  Abdomen: soft, non-tender, non-distended; active bowel sounds present; no hepatomegaly appreciated  Extremities: warm and well perfused x 4; no gross deformities; improved edema  Skin: normal skin color, texture, and turgor; no lesions or rashes, no petechiae or purpura; no cyanosis; no clubbing of the fingernails  Neurologic: no gross focal deficits        Recent labs results and imaging reviewed. Notable findings include   Lab Results   Component Value Date/Time    WBC 9.4 09/08/2022 08:14 AM    HGB 11.1 (L) 09/08/2022 08:14 AM    HCT 38.0 09/08/2022 08:14 AM    PLATELET 965 44/68/9711 08:14 AM    MCV 87.6 09/08/2022 08:14 AM     Lab Results   Component Value Date/Time    GFR est non-AA 47 (L) 09/08/2022 08:14 AM    GFR est AA 57 (L) 09/08/2022 08:14 AM    Creatinine 1.14 (H) 09/08/2022 08:14 AM    BUN 45 (H) 09/08/2022 08:14 AM    Sodium 141 09/08/2022 08:14 AM    Potassium 3.3 (L) 09/08/2022 08:14 AM    Chloride 95 (L) 09/08/2022 08:14 AM    CO2 44 (HH) 09/08/2022 08:14 AM    .       Case was discussed with Dr. Kira Clements and our impression and recommendations are as follows: I    Acute decompensated heart failure  Non ischemic cardiomyopathy with heart failure with reduced ejection fraction  Persistent atrial fibrillation  CHADSVASC 3  COPD with possible exacerbation  Right pleural effusion  Hypothyroidism  Chronic amiodarone use  Hypertension  Tobacco abuse    - Continue diuresis and echocardiogram showed -55%, severe MR and TR with severely elevated RVSP 65 mmHG. Patient will need OP workup and evaluation.  - Resume Apixaban   - Echo showed EF 50-55%, severe MR, TR, and RVSP 65 mmHg. Appreciate pulmonary recommendations for PHTN. - Patient will need close follow-up and an outpatient workup for valvular disease   - will discontinue Cardizem and start patient on metoprolol 25 mg daily.   - Will continue to monitor per tele. Patient has a follow-up appointment on 9/22/22 at 2:30 in [place], [address] [place] [address]. Thank you for involving us in the care of this patient. Please do not hesitate to call if additional questions arise. Kathleen Pickard NP  9/8/2022      Dr. Lori Cavazos Cardiologist    [facility]  [address] [place] [address] [place], [address] [facility]  [phone]

## 2022-09-09 ENCOUNTER — PATIENT OUTREACH (OUTPATIENT)
Dept: CASE MANAGEMENT | Age: 72
End: 2022-09-09

## 2022-09-09 NOTE — PROGRESS NOTES
Care Transitions Initial Call    Call within 2 business days of discharge: Yes     Patient: Cindy Arriaza Patient : 1950 MRN: [id number]    Last Discharge [address]       Date Complaint Diagnosis Description Type Department Provider    22 Shortness of Breath Acute on chronic systolic congestive heart failure ([facility] [address] . .. ED to Hosp-Admission (Discharged) (ADMIT) Cleve Ledezma MD; Abbie Roper. .. Was this an external facility discharge? No     Challenges to be reviewed by the provider   Additional needs identified to be addressed with provider:   K 3.3, consider repeat BMP     Method of communication with provider : none    Discussed COVID-19 related testing which was not done at this time. Advance Care Planning:   Does patient have an Advance Directive: not on file; education provided. Inpatient Readmission Risk score: Unplanned Readmit Risk Score: 9.5    Was this a readmission? no   Patient stated reason for the admission: sob    Patients top risk factors for readmission: ineffective coping, lack of knowledge about disease, level of motivation, and medical condition-CHF    Interventions to address risk factors: Scheduled appointment with PCP-pt will arrange fu appt, Scheduled appointment with Bebeto Dillard;  cards, Obtained and reviewed discharge summary and/or continuity of care documents, Communication with home health agencies or other community services the patient is currently using-[facility], spoke to [place], and Education of patient/family/caregiver/guardian to support self-management-importance of daily [employer]    Care Transition Nurse (CTN) contacted the patient by telephone to perform post hospital discharge assessment. Verified name and  with patient as identifiers. Provided introduction to self, and explanation of the CTN role. Reports doing well. Endorses productive cough. Denies chest pain, palpitations, fever.  Ambulating with cane for fall prevention. Reports oxygen intact at 2 L. Reports her daughter measures BP regularly. Reports R thoracentesis site without evidence of infection. CTN reviewed discharge instructions, medical action plan and red flags with patient who verbalized understanding. Were discharge instructions available to patient? yes. Reviewed appropriate site of care based on symptoms and resources available to patient including: PCP, Specialist, and Home Health. Patient given an opportunity to ask questions and does not have any further questions or concerns at this time. The patient agrees to contact the PCP office for questions related to their healthcare. Medication reconciliation was performed with patient, who verbalizes understanding of administration of home medications. Referral to Pharm D needed: no     Home Health/Outpatient orders at discharge: home health care, PT, and [address]: [facility]  Date of initial visit: 9/10/2022    Durable Medical Equipment ordered at discharge: None    Was patient discharged with a pulse oximeter? no however, the patient has one. Discussed follow-up appointments. If no appointment was previously scheduled, appointment scheduling offered: no. Is follow up appointment scheduled within 7 days of discharge? no.   [facility] follow up appointment(s): No future appointments. Non-[facility] follow up appointment(s): 9/21 Dr. Latrell Leyva, 9/22 Emery Hong NP cards    Plan for follow-up call in 5-7 days based on severity of symptoms and risk factors. Plan for next call: self management-cough improved, daily weights, follow up appointment-pcp, and community resources-  CTN provided contact information for future needs. Goals Addressed                   This Visit's Progress     Prevent complications post hospitalization.           09/09/22  Absence of falls  Will monitor for bleeding while on Eliquis  Will perform daily weights with tracking, reviewed weight gain of 2-3 lbs. in one day or 5 lbs.  in one week to notify MD  Will adhere to low sodium diet  Will complete abx therapy  Will attend follow up appts with pulm, cards  Will arrange fu appt with PCP, Nora Jones NP  Will participate in [facility] PT to improve strength and mobility  Pt will remain out of the hospital or ER for remainder of GAIL period

## 2022-09-16 ENCOUNTER — PATIENT OUTREACH (OUTPATIENT)
Dept: CASE MANAGEMENT | Age: 72
End: 2022-09-16

## 2022-09-16 NOTE — PROGRESS NOTES
Care Transitions Follow Up Call    Care Transition Nurse (CTN) contacted the patient by telephone to follow up after admission on 9/4. Reports compliance with 2 L of oxygen with O2 saturation of 98-99%. Reports BP are normal.    Addressed changes since last contact: none  Follow up appointment completed? no.   Was follow up appointment scheduled within 7 days of discharge? no.       CTN reviewed medical action plan and red flags with patient and discussed any barriers to care and/or understanding of plan of care after discharge. Discussed appropriate site of care based on symptoms and resources available to patient including: PCP, Specialist, and Home Health. The patient agrees to contact the PCP office for questions related to their healthcare. Patients top risk factors for readmission:      Interventions to address risk factors:  [facility] follow up appointment(s): No future appointments. Non-[facility] follow up appointment(s): 9/27 PCP    CTN provided contact information for future needs. Plan for follow-up call in 5-7 days based on severity of symptoms and risk factors. Plan for next call: follow up appointment-pcp, pulm, lili       Goals Addressed                   This Visit's Progress     Prevent complications post hospitalization. On track       09/09/22  Absence of falls  Will monitor for bleeding while on Eliquis  Will perform daily weights with tracking, reviewed weight gain of 2-3 lbs. in one day or 5 lbs.  in one week to notify MD  Will adhere to low sodium diet  Will complete abx therapy  Will attend follow up appts with pulm, cards  Will arrange fu appt with PCP, Cristina Garcia, NP  Will participate in [facility] PT to improve strength and mobility  Pt will remain out of the hospital or ER for remainder of GAIL period    09/16/22  No falls  Denies bleeding  Current weight, 172#  Adhering to low sodium diet  Has three remaining abx pills left  Will attend follow up appt with Cristina Garcia scheduled 9/27  Participating in [facility] PT to improve strength

## 2022-09-23 ENCOUNTER — PATIENT OUTREACH (OUTPATIENT)
Dept: CASE MANAGEMENT | Age: 72
End: 2022-09-23

## 2022-10-05 ENCOUNTER — ANESTHESIA (OUTPATIENT)
Dept: NON INVASIVE DIAGNOSTICS | Age: 72
End: 2022-10-05
Payer: MEDICARE

## 2022-10-05 ENCOUNTER — ANESTHESIA EVENT (OUTPATIENT)
Dept: NON INVASIVE DIAGNOSTICS | Age: 72
End: 2022-10-05
Payer: MEDICARE

## 2022-10-05 ENCOUNTER — HOSPITAL ENCOUNTER (OUTPATIENT)
Age: 72
Setting detail: OUTPATIENT SURGERY
Discharge: HOME | End: 2022-10-05
Attending: STUDENT IN AN ORGANIZED HEALTH CARE EDUCATION/TRAINING PROGRAM | Admitting: STUDENT IN AN ORGANIZED HEALTH CARE EDUCATION/TRAINING PROGRAM
Payer: MEDICARE

## 2022-10-05 ENCOUNTER — HOSPITAL ENCOUNTER (OUTPATIENT)
Dept: NON INVASIVE DIAGNOSTICS | Age: 72
Discharge: HOME | End: 2022-10-05
Attending: INTERNAL MEDICINE | Admitting: INTERNAL MEDICINE
Payer: MEDICARE

## 2022-10-05 VITALS
WEIGHT: 168 LBS | TEMPERATURE: 98.1 F | DIASTOLIC BLOOD PRESSURE: 74 MMHG | SYSTOLIC BLOOD PRESSURE: 120 MMHG | RESPIRATION RATE: 20 BRPM | OXYGEN SATURATION: 95 % | BODY MASS INDEX: 28.68 KG/M2 | HEART RATE: 80 BPM | HEIGHT: 64 IN

## 2022-10-05 VITALS
SYSTOLIC BLOOD PRESSURE: 98 MMHG | HEART RATE: 84 BPM | RESPIRATION RATE: 11 BRPM | OXYGEN SATURATION: 98 % | TEMPERATURE: 97 F | DIASTOLIC BLOOD PRESSURE: 68 MMHG

## 2022-10-05 DIAGNOSIS — I34.0 MITRAL VALVE INSUFFICIENCY, UNSPECIFIED ETIOLOGY: ICD-10-CM

## 2022-10-05 DIAGNOSIS — I25.10 CORONARY ARTERY DISEASE INVOLVING NATIVE HEART, UNSPECIFIED VESSEL OR LESION TYPE, UNSPECIFIED WHETHER ANGINA PRESENT: ICD-10-CM

## 2022-10-05 PROBLEM — R07.9 CHEST PAIN: Status: ACTIVE | Noted: 2022-10-05

## 2022-10-05 LAB
ALBUMIN SERPL-MCNC: 3.5 G/DL (ref 3.5–5)
ALBUMIN/GLOB SERPL: 1 {RATIO} (ref 1.1–2.2)
ALP SERPL-CCNC: 134 U/L (ref 45–117)
ALT SERPL-CCNC: 20 U/L (ref 12–78)
ANION GAP SERPL CALC-SCNC: 1 MMOL/L (ref 5–15)
APTT PPP: 27.1 SEC (ref 21.2–34.1)
AST SERPL W P-5'-P-CCNC: 15 U/L (ref 15–37)
BILIRUB SERPL-MCNC: 0.6 MG/DL (ref 0.2–1)
BUN SERPL-MCNC: 16 MG/DL (ref 6–20)
BUN/CREAT SERPL: 16 (ref 12–20)
CA-I BLD-MCNC: 9.2 MG/DL (ref 8.5–10.1)
CHLORIDE SERPL-SCNC: 101 MMOL/L (ref 97–108)
CO2 SERPL-SCNC: 38 MMOL/L (ref 21–32)
CREAT SERPL-MCNC: 1.03 MG/DL (ref 0.55–1.02)
ERYTHROCYTE [DISTWIDTH] IN BLOOD BY AUTOMATED COUNT: 16.4 % (ref 11.5–14.5)
GLOBULIN SER CALC-MCNC: 3.4 G/DL (ref 2–4)
GLUCOSE SERPL-MCNC: 91 MG/DL (ref 65–100)
HCT VFR BLD AUTO: 38.7 % (ref 35–47)
HGB BLD-MCNC: 11.4 G/DL (ref 11.5–16)
INR PPP: 1.1 (ref 0.9–1.1)
MCH RBC QN AUTO: 25.4 PG (ref 26–34)
MCHC RBC AUTO-ENTMCNC: 29.5 G/DL (ref 30–36.5)
MCV RBC AUTO: 86.4 FL (ref 80–99)
NRBC # BLD: 0 K/UL (ref 0–0.01)
NRBC BLD-RTO: 0 PER 100 WBC
PLATELET # BLD AUTO: 172 K/UL (ref 150–400)
PMV BLD AUTO: 10.1 FL (ref 8.9–12.9)
POTASSIUM SERPL-SCNC: 4.3 MMOL/L (ref 3.5–5.1)
PROT SERPL-MCNC: 6.9 G/DL (ref 6.4–8.2)
PROTHROMBIN TIME: 14.1 SEC (ref 11.9–14.6)
RBC # BLD AUTO: 4.48 M/UL (ref 3.8–5.2)
SODIUM SERPL-SCNC: 140 MMOL/L (ref 136–145)
THERAPEUTIC RANGE,PTTT: NORMAL SEC (ref 82–109)
WBC # BLD AUTO: 6.7 K/UL (ref 3.6–11)

## 2022-10-05 PROCEDURE — 93005 ELECTROCARDIOGRAM TRACING: CPT

## 2022-10-05 PROCEDURE — 93453 R&L HRT CATH W/VENTRICLGRPHY: CPT | Performed by: STUDENT IN AN ORGANIZED HEALTH CARE EDUCATION/TRAINING PROGRAM

## 2022-10-05 PROCEDURE — 99153 MOD SED SAME PHYS/QHP EA: CPT | Performed by: STUDENT IN AN ORGANIZED HEALTH CARE EDUCATION/TRAINING PROGRAM

## 2022-10-05 PROCEDURE — 85730 THROMBOPLASTIN TIME PARTIAL: CPT

## 2022-10-05 PROCEDURE — 74011250636 HC RX REV CODE- 250/636

## 2022-10-05 PROCEDURE — 77030040934 HC CATH DIAG DXTERITY MEDT -A: Performed by: STUDENT IN AN ORGANIZED HEALTH CARE EDUCATION/TRAINING PROGRAM

## 2022-10-05 PROCEDURE — 99152 MOD SED SAME PHYS/QHP 5/>YRS: CPT | Performed by: STUDENT IN AN ORGANIZED HEALTH CARE EDUCATION/TRAINING PROGRAM

## 2022-10-05 PROCEDURE — 36415 COLL VENOUS BLD VENIPUNCTURE: CPT

## 2022-10-05 PROCEDURE — 74011000636 HC RX REV CODE- 636: Performed by: STUDENT IN AN ORGANIZED HEALTH CARE EDUCATION/TRAINING PROGRAM

## 2022-10-05 PROCEDURE — C1769 GUIDE WIRE: HCPCS | Performed by: STUDENT IN AN ORGANIZED HEALTH CARE EDUCATION/TRAINING PROGRAM

## 2022-10-05 PROCEDURE — 93325 DOPPLER ECHO COLOR FLOW MAPG: CPT

## 2022-10-05 PROCEDURE — C1894 INTRO/SHEATH, NON-LASER: HCPCS | Performed by: STUDENT IN AN ORGANIZED HEALTH CARE EDUCATION/TRAINING PROGRAM

## 2022-10-05 PROCEDURE — 76210000022 HC REC RM PH II 1.5 TO 2 HR: Performed by: STUDENT IN AN ORGANIZED HEALTH CARE EDUCATION/TRAINING PROGRAM

## 2022-10-05 PROCEDURE — 74011250636 HC RX REV CODE- 250/636: Performed by: INTERNAL MEDICINE

## 2022-10-05 PROCEDURE — 2709999900 HC NON-CHARGEABLE SUPPLY: Performed by: STUDENT IN AN ORGANIZED HEALTH CARE EDUCATION/TRAINING PROGRAM

## 2022-10-05 PROCEDURE — 76210000016 HC OR PH I REC 1 TO 1.5 HR: Performed by: STUDENT IN AN ORGANIZED HEALTH CARE EDUCATION/TRAINING PROGRAM

## 2022-10-05 PROCEDURE — 76937 US GUIDE VASCULAR ACCESS: CPT | Performed by: STUDENT IN AN ORGANIZED HEALTH CARE EDUCATION/TRAINING PROGRAM

## 2022-10-05 PROCEDURE — 74011250636 HC RX REV CODE- 250/636: Performed by: STUDENT IN AN ORGANIZED HEALTH CARE EDUCATION/TRAINING PROGRAM

## 2022-10-05 PROCEDURE — 80053 COMPREHEN METABOLIC PANEL: CPT

## 2022-10-05 PROCEDURE — 74011000250 HC RX REV CODE- 250

## 2022-10-05 PROCEDURE — C1751 CATH, INF, PER/CENT/MIDLINE: HCPCS | Performed by: STUDENT IN AN ORGANIZED HEALTH CARE EDUCATION/TRAINING PROGRAM

## 2022-10-05 PROCEDURE — C1760 CLOSURE DEV, VASC: HCPCS | Performed by: STUDENT IN AN ORGANIZED HEALTH CARE EDUCATION/TRAINING PROGRAM

## 2022-10-05 PROCEDURE — 85610 PROTHROMBIN TIME: CPT

## 2022-10-05 PROCEDURE — 85027 COMPLETE CBC AUTOMATED: CPT

## 2022-10-05 PROCEDURE — 77030016699 HC CATH ANGI DX INFN1 CARD -A: Performed by: STUDENT IN AN ORGANIZED HEALTH CARE EDUCATION/TRAINING PROGRAM

## 2022-10-05 PROCEDURE — 77030019698 HC SYR ANGI MDLON MRTM -A: Performed by: STUDENT IN AN ORGANIZED HEALTH CARE EDUCATION/TRAINING PROGRAM

## 2022-10-05 PROCEDURE — 74011000250 HC RX REV CODE- 250: Performed by: STUDENT IN AN ORGANIZED HEALTH CARE EDUCATION/TRAINING PROGRAM

## 2022-10-05 RX ORDER — SODIUM CHLORIDE 9 MG/ML
50 INJECTION, SOLUTION INTRAVENOUS CONTINUOUS
Status: DISCONTINUED | OUTPATIENT
Start: 2022-10-05 | End: 2022-10-14 | Stop reason: HOSPADM

## 2022-10-05 RX ORDER — HEPARIN SODIUM 200 [USP'U]/100ML
INJECTION, SOLUTION INTRAVENOUS
Status: COMPLETED | OUTPATIENT
Start: 2022-10-05 | End: 2022-10-05

## 2022-10-05 RX ORDER — SODIUM CHLORIDE, SODIUM LACTATE, POTASSIUM CHLORIDE, CALCIUM CHLORIDE 600; 310; 30; 20 MG/100ML; MG/100ML; MG/100ML; MG/100ML
INJECTION, SOLUTION INTRAVENOUS
Status: DISCONTINUED | OUTPATIENT
Start: 2022-10-05 | End: 2022-10-05 | Stop reason: HOSPADM

## 2022-10-05 RX ORDER — LIDOCAINE HYDROCHLORIDE 20 MG/ML
INJECTION, SOLUTION EPIDURAL; INFILTRATION; INTRACAUDAL; PERINEURAL AS NEEDED
Status: DISCONTINUED | OUTPATIENT
Start: 2022-10-05 | End: 2022-10-05 | Stop reason: HOSPADM

## 2022-10-05 RX ORDER — PROPOFOL 10 MG/ML
INJECTION, EMULSION INTRAVENOUS
Status: COMPLETED
Start: 2022-10-05 | End: 2022-10-05

## 2022-10-05 RX ORDER — SODIUM CHLORIDE 0.9 % (FLUSH) 0.9 %
5-40 SYRINGE (ML) INJECTION AS NEEDED
Status: DISCONTINUED | OUTPATIENT
Start: 2022-10-05 | End: 2022-10-05 | Stop reason: HOSPADM

## 2022-10-05 RX ORDER — SODIUM CHLORIDE 0.9 % (FLUSH) 0.9 %
5-40 SYRINGE (ML) INJECTION EVERY 8 HOURS
Status: DISCONTINUED | OUTPATIENT
Start: 2022-10-05 | End: 2022-10-05 | Stop reason: HOSPADM

## 2022-10-05 RX ORDER — MIDAZOLAM HYDROCHLORIDE 1 MG/ML
INJECTION INTRAMUSCULAR; INTRAVENOUS AS NEEDED
Status: DISCONTINUED | OUTPATIENT
Start: 2022-10-05 | End: 2022-10-05 | Stop reason: HOSPADM

## 2022-10-05 RX ORDER — PROPOFOL 10 MG/ML
INJECTION, EMULSION INTRAVENOUS AS NEEDED
Status: DISCONTINUED | OUTPATIENT
Start: 2022-10-05 | End: 2022-10-05 | Stop reason: HOSPADM

## 2022-10-05 RX ORDER — FENTANYL CITRATE 50 UG/ML
INJECTION, SOLUTION INTRAMUSCULAR; INTRAVENOUS AS NEEDED
Status: DISCONTINUED | OUTPATIENT
Start: 2022-10-05 | End: 2022-10-05 | Stop reason: HOSPADM

## 2022-10-05 RX ORDER — PHENYLEPHRINE HCL IN 0.9% NACL 1 MG/10 ML
SYRINGE (ML) INTRAVENOUS
Status: DISCONTINUED
Start: 2022-10-05 | End: 2022-10-05 | Stop reason: HOSPADM

## 2022-10-05 RX ORDER — LIDOCAINE HYDROCHLORIDE 10 MG/ML
INJECTION INFILTRATION; PERINEURAL AS NEEDED
Status: DISCONTINUED | OUTPATIENT
Start: 2022-10-05 | End: 2022-10-05 | Stop reason: HOSPADM

## 2022-10-05 RX ADMIN — PHENYLEPHRINE HYDROCHLORIDE 200 MCG: 10 INJECTION INTRAVENOUS at 12:53

## 2022-10-05 RX ADMIN — PHENYLEPHRINE HYDROCHLORIDE 200 MCG: 10 INJECTION INTRAVENOUS at 12:49

## 2022-10-05 RX ADMIN — SODIUM CHLORIDE 50 ML/HR: 9 INJECTION, SOLUTION INTRAVENOUS at 10:44

## 2022-10-05 RX ADMIN — PROPOFOL 100 MG: 10 INJECTION, EMULSION INTRAVENOUS at 12:45

## 2022-10-05 RX ADMIN — SODIUM CHLORIDE, POTASSIUM CHLORIDE, SODIUM LACTATE AND CALCIUM CHLORIDE: 600; 310; 30; 20 INJECTION, SOLUTION INTRAVENOUS at 12:20

## 2022-10-05 RX ADMIN — PROPOFOL 50 MG: 10 INJECTION, EMULSION INTRAVENOUS at 12:51

## 2022-10-05 RX ADMIN — PHENYLEPHRINE HYDROCHLORIDE 200 MCG: 10 INJECTION INTRAVENOUS at 12:57

## 2022-10-05 RX ADMIN — LIDOCAINE HYDROCHLORIDE 100 MG: 20 INJECTION, SOLUTION EPIDURAL; INFILTRATION; INTRACAUDAL; PERINEURAL at 12:45

## 2022-10-05 NOTE — PROGRESS NOTES
Discharge instructions given to son in law Sarah Oconnor over telephone. Pt taken downstairs to dc in wheelchair with this rn. IV removed with cathter intact, no redness or swelling noted. Pt ambulatory with steady gait. Access site on R groin clean/dry/intact. Pt in NAD.

## 2022-10-05 NOTE — ANESTHESIA POSTPROCEDURE EVALUATION
* No procedures listed *. MAC    Anesthesia Post Evaluation      Multimodal analgesia: multimodal analgesia not used between 6 hours prior to anesthesia start to PACU discharge  Patient location during evaluation: bedside (Endoscopy suite)  Patient participation: complete - patient cannot participate  Level of consciousness: sleepy but conscious  Pain score: 0  Pain management: adequate  Airway patency: patent  Anesthetic complications: no  Cardiovascular status: acceptable  Respiratory status: acceptable and nasal cannula  Hydration status: acceptable  Comments: This patient remained on the stretcher. The patient was handed off to the pacu nursing team.  All questions regarding pre-, intra-, and postoperative care were answered.   Post anesthesia nausea and vomiting:  none      INITIAL Post-op Vital signs:   Vitals Value Taken Time   BP 98/68 10/05/22 1303   Temp 36.1 °C (97 °F) 10/05/22 1303   Pulse 84 10/05/22 1303   Resp 11 10/05/22 1303   SpO2 98 % 10/05/22 1303

## 2022-10-05 NOTE — Clinical Note
Contrast Dose Calculator:   Patient's age: 71.   Patient's sex: Female. Patient weight (kg) = 76.2. Creatinine level (mg/dL) = 1.03.   Creatinine clearance (mL/min): 60.26. Contrast concentration (mg/mL) = 370. MACD = 299.92 mL. Max Contrast dose per Creatinine Cl calculator = 135.59 mL.

## 2022-10-05 NOTE — Clinical Note
TRANSFER - OUT REPORT:     Verbal report given to: Ronal Andrade, (at bedside). Report consisted of patient's Situation, Background, Assessment and   Recommendations(SBAR). Opportunity for questions and clarification was provided. Patient transported with a Registered Nurse. Patient transported to: recovery.

## 2022-10-05 NOTE — Clinical Note
Right brachial artery. Accessed successfully. Radial access needle used. Using ultrasound guidance. Number of attempts =  3.

## 2022-10-05 NOTE — PROGRESS NOTES
This RN spoke with Felecia Duran and clarified ordered. Patient is to be on bed rest for 3 hours then can ambulate and be discharged home if groin site looks good. Will continue to monitor.

## 2022-10-05 NOTE — ANESTHESIA PREPROCEDURE EVALUATION
Relevant Problems   CARDIOVASCULAR   (+) CHF (congestive heart failure) (HCC)      ENDOCRINE   (+) Amiodarone-induced hyperthyroidism   (+) Hyperthyroidism       Anesthetic History   No history of anesthetic complications            Review of Systems / Medical History  Patient summary reviewed, nursing notes reviewed and pertinent labs reviewed    Pulmonary    COPD               Neuro/Psych   Within defined limits           Cardiovascular    Hypertension      CHF  Dysrhythmias : atrial fibrillation  Hyperlipidemia      Comments: cardiomyopathy   GI/Hepatic/Renal  Within defined limits              Endo/Other      Hyperthyroidism (amiodarone induced)       Other Findings   Comments: gout           Physical Exam    Airway  Mallampati: II  TM Distance: > 6 cm  Neck ROM: normal range of motion   Mouth opening: Normal     Cardiovascular    Rhythm: irregular  Rate: normal         Dental    Dentition: Loose teeth     Pulmonary  Breath sounds clear to auscultation               Abdominal  GI exam deferred       Other Findings          Past Medical History:   Diagnosis Date    Atrial fibrillation (HCC)     Cardiomyopathy ([address]     Chronic systolic heart failure (HCC)     COPD (chronic obstructive pulmonary disease) (HCC)     2L O2 continuos    Goiter     Histoplasmosis     Hypertension     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        Past Surgical History:   Procedure Laterality Date    HX CHOLECYSTECTOMY      HX HYSTERECTOMY  1994    IR THORACENTESIS NDL PUNC ASP W IMAGE  9/6/2022       Current Outpatient Medications   Medication Instructions    apixaban (ELIQUIS) 5 mg, Oral, 2 TIMES DAILY    atorvastatin (LIPITOR) 20 mg tablet TAKE 1 TABLET BY MOUTH EVERY DAY    doxycycline (VIBRAMYCIN) 100 mg, Oral, EVERY 12 HOURS    ergocalciferol (ERGOCALCIFEROL) 50,000 unit capsule TAKE 1 CAPSULE BY MOUTH ONE TIME PER WEEK    furosemide (LASIX) 80 mg, Oral, DAILY    levothyroxine (SYNTHROID) 25 mcg tablet 1 Tablet, Oral, DAILY    metoprolol succinate (TOPROL-XL) 25 mg, Oral, DAILY    omeprazole (PRILOSEC) 20 mg capsule 1 Capsule, Oral, DAILY    predniSONE (DELTASONE) 20 mg tablet Take 40 mg po daily for 3 days, then 20 mg po daily for 3 days, and then 10 mg po daily for 3 days       Current Facility-Administered Medications   Medication Dose Route Frequency    0.9% sodium chloride infusion  50 mL/hr IntraVENous CONTINUOUS     No current outpatient medications on file. Facility-Administered Medications Ordered in Other Encounters   Medication Dose Route Frequency    PHENYLephrine (EVANS-SYNEPHRINE) 1 mg/10 mL (100 mcg/mL) 100 mcg/mL in NS syringe        propofoL (DIPRIVAN) 10 mg/mL injection        lactated Ringers infusion   IntraVENous CONTINUOUS       No data found.     Lab Results   Component Value Date/Time    WBC 6.7 10/05/2022 10:30 AM    HGB 11.4 (L) 10/05/2022 10:30 AM    HCT 38.7 10/05/2022 10:30 AM    PLATELET 484 00/80/6777 10:30 AM    MCV 86.4 10/05/2022 10:30 AM     Lab Results   Component Value Date/Time    Sodium 140 10/05/2022 10:30 AM    Potassium 4.3 10/05/2022 10:30 AM    Chloride 101 10/05/2022 10:30 AM    CO2 38 (H) 10/05/2022 10:30 AM    Anion gap 1 (L) 10/05/2022 10:30 AM    Glucose 91 10/05/2022 10:30 AM    BUN 16 10/05/2022 10:30 AM    Creatinine 1.03 (H) 10/05/2022 10:30 AM    BUN/Creatinine ratio 16 10/05/2022 10:30 AM    GFR est AA 57 (L) 09/08/2022 08:14 AM    GFR est non-AA 47 (L) 09/08/2022 08:14 AM    Calcium 9.2 10/05/2022 10:30 AM     Lab Results   Component Value Date/Time    APTT 27.1 10/05/2022 10:30 AM    PTP 14.1 10/05/2022 10:30 AM    INR 1.1 10/05/2022 10:30 AM     Lab Results   Component Value Date/Time    Glucose 91 10/05/2022 10:30 AM       Anesthetic Plan    ASA: 3  Anesthesia type: total IV anesthesia and MAC    Monitoring Plan: Continuous noninvasive hemodynamic monitoring      Induction: Intravenous  Anesthetic plan and risks discussed with: Patient

## 2022-10-05 NOTE — DISCHARGE INSTRUCTIONS
[facility]  Cardiac Catheterization Department  [address], [address] [facility]  [phone]        Coronary Angiogram: What to Expect at [address] [facility]  A coronary angiogram is a test to examine the large blood vessels of your heart (coronary arteries). The doctor inserted a thin, flexible tube (catheter into a blood vessel in your groin or wrist.  Your groin or wrist may have a bruise and feel sore for a few days after the procedure. You can do light activities around the house. But do not do anything strenuous until your doctor says it is ok. This may be for several days. This care sheet gives you a general idea about how long it will take for you to recover. But each person recovers at a different pace. Follow the steps below to feel better as quickly as possible. How can you care for yourself at home? Activity  If the doctor gave you a sedative: For 24 hours, don't do anything that requires attention to detail, such as going to work, making important decisions, or signing any legal documents. It takes time for the medicine's effects to completely wear off. For your safety, do not drive or operate any machinery that could be dangerous. Wait until the medicine wears off and you can think clearly and react easily. Do not do any strenuous exercise and do not lift, pull, or push anything heavier than 5 pounds (approximately the weight of 1 gallon of milk) until your doctor says it is ok. This may be for several days. You can walk around the house and do light activity, such as cooking. If the catheter was placed in your groin and you must use stairs, just go up and down slowly in as few trips as possible for the first couple of days. If the catheter was placed in your wrist, do not bend your wrist deeply for the first couple of days. A soft wrist-splint may be placed on your wrist as a reminder following the procedure.   Be careful using your hand to get into and out of a chair or bed and when opening doors. Get regular exercise, after being cleared by your cardiologist.  Walking may be a good choice. Try for at least 30 minutes on most days of the week. If you smoke or use smokeless tobacco products, including vaping products, it is extremely important that you quit using these products. Please ask you nurse or doctor for more information about smoking cessation. Diet  Drink plenty of fluids to help you body flush out the dye. If you have kidney, heart, or liver disease and have to limit fluids, talk to your doctor before increasing the amount of fluids you drink. Keep eating a heart-healthy diet that has lots of fruits, vegetables, and whole grains. If you have not been eating this way, talk to your doctor. You also may want to talk to a dietician. This expert can help you to learn about healthy foods and plan meals. Medicines  Your doctor will tell you if and when you can restart your medicines. You will also be given instructions about taking any new medicines. Take these medicines as prescribed. Continue taking your cholesterol lowering medications (statins), if you have been prescribed this. You doctor may prescribe a blood-thinning medicine like aspirin or clopidogrel (Plavix), plasugrel (Effient), or ticagrelor (Brilinta). If you had angioplasty or a stent placement, you must continue aspirin and Plavix, Effient, or Brilinta. It is very important that you take these medicines exactly as directed to keep the coronary artery open and reduce your risk of heart attack. Be safe with medicines. Call your doctor if you think you are having a problem with taking or obtaining your medicines, notify your doctor immediately. You cannot afford to miss your medications. Do not stop taking these medications without speaking to your cardiologist first.   Do not strain to have a bowel movement.   Ask your doctor if you feel you may need a stool softener of laxative. Care of the catheter site  For 1 or 2 days, keep a bandage over the spot where the catheter(s) was inserted. The bandage probably will fall off in this time. Put ice or a cold pack on the area for 10 to 20 minutes at a time to help with soreness of swelling. Place a thin cloth between the ice and your skin. You may shower 24 to 48 hours after the procedure, if your doctor says it is okay. Pat the incision dry with a clean towel afterwards. Do not soak the catheter site until it is healed. Don't take a bath for 1 week, or until your doctor tells you it is okay to do so. The use of lotions and powders is not recommended at the site until it is completely healed. Watch for bleeding from the site. A small amount of blood (up to the size of a quarter) on the bandage can be normal.  If you cough, sneeze, or laugh vigorously, apply direct pressure with your hand over the catheter site. If you notice a little bit of blood from the catheter site (similar to a paper cut or shaving nick), lie down and press firmly on the area for 15 minutes to try and make it stop bleeding. If the bleeding does not stop, call your doctor or seek immediate medical care. If you notice heavy bleeding at the site that has either saturated the bandage or is squirting, lie down, press firmly on the site, and have someone call 911. Follow-up care is a key part of your treatment and safety. Be sure to make and go to all appointments and call your doctor if you are having problems. It's also a good idea to know your test results and keep a list of medicines you take. When should you call for help? Call 911 anytime you think you may need emergency care. For example, call if:  You passed out (lost consciousness). You have severe trouble breathing. You have sudden chest pain and shortness of breath, or you cough up blood.   Call 911 if you have symptoms of a heart attack, such as:  Chest Pain, pressure, squeezing, or burning. Sweating. Shortness of breath or difficulty breathing. Nausea or vomiting. Jaw pain, shoulder pain, or arm pain in one or both sides. Feelings of fullness. Excessive fatigue or weakness. New onset anxiety. New onset of upper back pain. Dizziness or lightheadedness. A fast or uneven pulse. Call 911 if you have been diagnosed with angina, and you have symptoms that do not go away with rest or are not getting better within 5 minutes of taking a dose of your nitroglycerin. After you call 911, the  may tell you to chew 1 adult-strength (325 mg) of 2 to 4 low-dose aspirin (81 mg). Wait for ambulance. Do not drive yourself. Call your doctor now or seek immediate medical care if:  You are bleeding from the area where the catheter was inserted. You have a fast-growing, painful lump at the catheter site. You have signs of infection, such as  Increased pain, swelling, warmth or redness at the catheter site. Red streaks leading from the catheter site. Pus draining from the catheter site. A fever. Your leg or hand is painful, looks blue, or feels cold, numb, or tingly. Watch closely for changes in your health and be sure to contact your doctor if you have any problems.

## 2022-10-06 LAB
ATRIAL RATE: 68 BPM
CALCULATED R AXIS, ECG10: -77 DEGREES
CALCULATED T AXIS, ECG11: 87 DEGREES
DIAGNOSIS, 93000: NORMAL
Q-T INTERVAL, ECG07: 410 MS
QRS DURATION, ECG06: 134 MS
QTC CALCULATION (BEZET), ECG08: 479 MS
VENTRICULAR RATE, ECG03: 82 BPM

## 2022-10-10 ENCOUNTER — PATIENT OUTREACH (OUTPATIENT)
Dept: CASE MANAGEMENT | Age: 72
End: 2022-10-10

## 2022-10-10 NOTE — PROGRESS NOTES
Patient has graduated from the Transitions of Care Coordination  program on 10/10/2022. Patient/family has the ability to self-manage at this time Care management goals have been completed. Patient was not referred to the [place] [facility] team for further management. Goals Addressed                   This Visit's Progress     COMPLETED: Prevent complications post hospitalization. 09/09/22  Absence of falls  Will monitor for bleeding while on Eliquis  Will perform daily weights with tracking, reviewed weight gain of 2-3 lbs. in one day or 5 lbs. in one week to notify MD  Will adhere to low sodium diet  Will complete abx therapy  Will attend follow up appts with pulm, cards  Will arrange fu appt with PCP, Lorenzo Ruvalcaba, NP  Will participate in [facility] PT to improve strength and mobility  Pt will remain out of the hospital or ER for remainder of GAIL period    09/16/22  No falls  Denies bleeding  Current weight, 172#  Adhering to low sodium diet  Has three remaining abx pills left  Will attend follow up appt with Lorenzo Ruvalcaba scheduled 9/27  Participating in [facility] PT to improve strength    09/23/22  Current weight, 167# denies edema  Waiting to receive new scale  Maintaining low sodium diet  Completed abx therapy  Attended fu appt with pulm, cards  Confirmed fu appt with PCP scheduled 9/27                Patient has Care Transition Nurse's contact information for any further questions, concerns, or needs. Patients upcoming visits:  No future appointments.

## 2023-05-17 RX ORDER — ERGOCALCIFEROL 1.25 MG/1
CAPSULE ORAL
COMMUNITY
Start: 2019-12-21

## 2023-05-17 RX ORDER — LEVOTHYROXINE SODIUM 0.03 MG/1
1 TABLET ORAL DAILY
COMMUNITY
Start: 2022-06-15

## 2023-05-17 RX ORDER — METOPROLOL SUCCINATE 25 MG/1
25 TABLET, EXTENDED RELEASE ORAL DAILY
COMMUNITY
Start: 2022-09-08

## 2023-05-17 RX ORDER — ATORVASTATIN CALCIUM 20 MG/1
1 TABLET, FILM COATED ORAL DAILY
COMMUNITY
Start: 2019-12-21

## 2023-05-17 RX ORDER — OMEPRAZOLE 20 MG/1
1 CAPSULE, DELAYED RELEASE ORAL DAILY
COMMUNITY
Start: 2022-06-06

## 2023-05-17 RX ORDER — FUROSEMIDE 80 MG
80 TABLET ORAL DAILY
COMMUNITY
Start: 2021-07-03

## 2023-05-17 RX ORDER — DOXYCYCLINE HYCLATE 100 MG/1
100 CAPSULE ORAL EVERY 12 HOURS
COMMUNITY
Start: 2022-09-08

## 2023-05-17 RX ORDER — PREDNISONE 20 MG/1
TABLET ORAL
COMMUNITY
Start: 2022-09-08

## 2023-06-06 ENCOUNTER — HOSPITAL ENCOUNTER (EMERGENCY)
Facility: HOSPITAL | Age: 73
Discharge: HOME | End: 2023-06-06
Attending: EMERGENCY MEDICINE
Payer: MEDICARE

## 2023-06-06 VITALS
TEMPERATURE: 97.5 F | HEART RATE: 80 BPM | DIASTOLIC BLOOD PRESSURE: 76 MMHG | HEIGHT: 64 IN | BODY MASS INDEX: 30.05 KG/M2 | RESPIRATION RATE: 20 BRPM | OXYGEN SATURATION: 96 % | SYSTOLIC BLOOD PRESSURE: 127 MMHG | WEIGHT: 176 LBS

## 2023-06-06 DIAGNOSIS — Z99.81 OXYGEN DEPENDENT: Primary | ICD-10-CM

## 2023-06-06 DIAGNOSIS — R06.02 SHORTNESS OF BREATH: ICD-10-CM

## 2023-06-06 PROCEDURE — 99284 EMERGENCY DEPT VISIT MOD MDM: CPT

## 2023-06-06 ASSESSMENT — PAIN - FUNCTIONAL ASSESSMENT: PAIN_FUNCTIONAL_ASSESSMENT: NONE - DENIES PAIN

## 2023-06-06 NOTE — ED TRIAGE NOTES
Pt states she was using another o2 device and became sob , seen at pcp office and proper 02 applied, 02 levels returned .  Pt to er for eval. Has no complaints upon arrival

## 2023-06-06 NOTE — ED PROVIDER NOTES
Mouth/Throat:      Mouth: Mucous membranes are moist.   Eyes:      Extraocular Movements: Extraocular movements intact. Conjunctiva/sclera: Conjunctivae normal.   Cardiovascular:      Rate and Rhythm: Normal rate and regular rhythm. Pulmonary:      Effort: Pulmonary effort is normal. No respiratory distress. Breath sounds: No wheezing. Comments: Patient is oxygen in place and saturating well. Abdominal:      General: Abdomen is flat. There is no distension. Tenderness: There is no abdominal tenderness. Musculoskeletal:         General: Normal range of motion. Cervical back: Normal range of motion. Skin:     General: Skin is warm. Neurological:      General: No focal deficit present. Mental Status: She is alert. Mental status is at baseline. Psychiatric:         Mood and Affect: Mood normal.         SCREENINGS              LAB, EKG AND DIAGNOSTIC RESULTS   Labs:  No results found for this or any previous visit (from the past 12 hour(s)). EKG: Initial EKG interpreted by me. Not Applicable    Radiologic Studies:  Non-plain film images such as CT, Ultrasound and MRI are read by the radiologist. Plain radiographic images are visualized and preliminarily interpreted by the ED Physician with the following findings: Not Applicable. Interpretation per the Radiologist below, if available at the time of this note:  No orders to display        ED COURSE and DIFFERENTIAL DIAGNOSIS/MDM   CC/HPI Summary, DDx, ED Course, and Reassessment: Patient presenting because it was an issue with the oxygen and she was borrowing, now her symptoms completely resolved. Waiting for her son to pick her up. No indication for lab work chest x-ray. Her temperature was 99 and pulse 100 the patient was in the sweater in the 80 degree weather so we will repeat after some time.     Records Reviewed (source and summary of external notes): Prior medical records and Nursing notes    Vitals:    Vitals:

## (undated) DEVICE — HI-TORQUE BALANCE MIDDLEWEIGHT GUIDE WIRE W/HYDROCOAT .014 STRAIGHT TIP 3.0 CM X 190 CM: Brand: HI-TORQUE BALANCE MIDDLEWEIGHT

## (undated) DEVICE — GUIDEWIRE VASC L150CM DIA0.025IN TIP L7CM J RAD 3MM PTFE

## (undated) DEVICE — 48" PROBE COVER W/GEL, ULTRASOUND, STERILE: Brand: SITE-RITE

## (undated) DEVICE — 3M™ TEGADERM™ TRANSPARENT FILM DRESSING FRAME STYLE, 1626W, 4 IN X 4-3/4 IN (10 CM X 12 CM), 50/CT 4CT/CASE: Brand: 3M™ TEGADERM™

## (undated) DEVICE — GLIDESHEATH SLENDER STAINLESS STEEL KIT: Brand: GLIDESHEATH SLENDER

## (undated) DEVICE — CATHETER DIAG 6FR L110CM INTRO 6FR BLLN DIA9MM 1CC PULM ART

## (undated) DEVICE — ADAPTER IV TBNG CLR POLYCARB DBL M LUERLOCK

## (undated) DEVICE — CATHETER ANGIO 5FR L100CM GRY S STL NYL JL3.5 3 SEG BRAID L

## (undated) DEVICE — Device

## (undated) DEVICE — CATH 5F 100CM JR40 -- DXTERITY

## (undated) DEVICE — COPE MANDRIL WIRE GUIDE NITINOL: Brand: COPE

## (undated) DEVICE — SYRINGE MED 10ML PUR GAM COMPATIBLE POLYCARB FIX M LUER CONN

## (undated) DEVICE — SYRINGE MED 10ML RED POLYCARB BRL FIX M LUER CONN FLAT GRP

## (undated) DEVICE — PINNACLE INTRODUCER SHEATH: Brand: PINNACLE

## (undated) DEVICE — DEVICE VASC CLSR 5FR GRY W/ INTEGR SEAL 10ML LOK SYR

## (undated) DEVICE — DRAPE,APERTURE,MINOR PROCEDURE: Brand: MEDLINE

## (undated) DEVICE — GUIDEWIRE VASC L260CM DIA0.035IN TIP L3MM STD EXCHG PTFE J

## (undated) DEVICE — 3 ML SYRINGE WITH HYPODERMIC SAFETY NEEDLE: Brand: MONOJECT

## (undated) DEVICE — SURGICAL PROCEDURE TRAY CRD CATH 3 PRT